# Patient Record
Sex: FEMALE | Race: WHITE | NOT HISPANIC OR LATINO | Employment: FULL TIME | URBAN - METROPOLITAN AREA
[De-identification: names, ages, dates, MRNs, and addresses within clinical notes are randomized per-mention and may not be internally consistent; named-entity substitution may affect disease eponyms.]

---

## 2018-02-02 ENCOUNTER — TRANSCRIBE ORDERS (OUTPATIENT)
Dept: RADIOLOGY | Facility: HOSPITAL | Age: 53
End: 2018-02-02

## 2018-02-02 ENCOUNTER — HOSPITAL ENCOUNTER (OUTPATIENT)
Dept: RADIOLOGY | Facility: HOSPITAL | Age: 53
Discharge: HOME | End: 2018-02-02
Attending: INTERNAL MEDICINE
Payer: COMMERCIAL

## 2018-02-02 DIAGNOSIS — R05.9 COUGH: ICD-10-CM

## 2018-02-02 DIAGNOSIS — R05.9 COUGH: Primary | ICD-10-CM

## 2018-02-02 PROCEDURE — 71046 X-RAY EXAM CHEST 2 VIEWS: CPT

## 2018-02-10 ENCOUNTER — OFFICE VISIT (OUTPATIENT)
Dept: URGENT CARE | Age: 53
End: 2018-02-10
Payer: COMMERCIAL

## 2018-02-10 VITALS
RESPIRATION RATE: 20 BRPM | HEIGHT: 65 IN | SYSTOLIC BLOOD PRESSURE: 140 MMHG | OXYGEN SATURATION: 98 % | WEIGHT: 157.8 LBS | HEART RATE: 80 BPM | TEMPERATURE: 97.2 F | DIASTOLIC BLOOD PRESSURE: 80 MMHG | BODY MASS INDEX: 26.29 KG/M2

## 2018-02-10 DIAGNOSIS — R05.9 COUGH: Primary | ICD-10-CM

## 2018-02-10 PROCEDURE — 71046 X-RAY EXAM CHEST 2 VIEWS: CPT

## 2018-02-10 PROCEDURE — 99212 OFFICE O/P EST SF 10 MIN: CPT | Performed by: FAMILY MEDICINE

## 2018-02-10 RX ORDER — AZITHROMYCIN 250 MG/1
TABLET, FILM COATED ORAL
Qty: 6 TABLET | Refills: 0 | Status: SHIPPED | OUTPATIENT
Start: 2018-02-10 | End: 2018-02-14

## 2018-02-10 RX ORDER — ASPIRIN 81 MG/1
TABLET, CHEWABLE ORAL
COMMUNITY

## 2018-02-10 RX ORDER — PROMETHAZINE HYDROCHLORIDE AND CODEINE PHOSPHATE 6.25; 1 MG/5ML; MG/5ML
5 SYRUP ORAL EVERY 4 HOURS PRN
COMMUNITY
End: 2020-10-02

## 2018-02-10 RX ORDER — BENZONATATE 100 MG/1
100 CAPSULE ORAL 3 TIMES DAILY PRN
Qty: 20 CAPSULE | Refills: 0 | Status: SHIPPED | OUTPATIENT
Start: 2018-02-10 | End: 2020-10-02

## 2018-02-10 RX ORDER — ALBUTEROL SULFATE 90 UG/1
2 AEROSOL, METERED RESPIRATORY (INHALATION) EVERY 6 HOURS PRN
COMMUNITY
End: 2020-10-02 | Stop reason: SDUPTHER

## 2018-02-10 RX ORDER — MELATONIN
1000 DAILY
COMMUNITY
End: 2020-10-02

## 2018-02-10 RX ORDER — VIT A/VIT C/VIT E/ZINC/COPPER 4296-226
CAPSULE ORAL
COMMUNITY
End: 2020-10-02

## 2018-02-10 RX ORDER — CETIRIZINE HYDROCHLORIDE 10 MG/1
TABLET ORAL
COMMUNITY

## 2018-02-10 NOTE — PATIENT INSTRUCTIONS
Possible increased consolidation on xray  Will treat  Rest and drink extra fluids  Start antibiotic  Take probiotic  Cough medication as prescribed  Cool mist humidification  Follow up with PCP if no improvement  Go to ER with worsening symptoms

## 2018-02-10 NOTE — PROGRESS NOTES
[facility]        NAME: Rafael Cadena is a 46 y o  female  : 1965    MRN: [id number]  DATE: 2018  TIME: 1:07 PM    Assessment and Plan   Cough [R05]  1  Cough  XR chest pa & lateral    azithromycin (ZITHROMAX) 250 mg tablet    benzonatate (TESSALON PERLES) 100 mg capsule         Patient Instructions     Patient Instructions   Possible increased consolidation on xray  Will treat  Rest and drink extra fluids  Start antibiotic  Take probiotic  Cough medication as prescribed  Cool mist humidification  Follow up with PCP if no improvement  Go to ER with worsening symptoms  Chief Complaint     Chief Complaint   Patient presents with    Fatigue     Pt was told by pcp she probably had the flu  Was tested and swab came out negative  2 weeks ago  Pt is still not feeling well   Cough     Pt states her cough is very productive, very thick  Xray was negative 2 weeks ago for pneuomia  Pt states she is on a inhaler and cough medication with codiene and still feels like her lungs are not better  Pt states she still feels like she has a fever but has checked several times and no fever noted  V         History of Present Illness   Rafael Cadena presents to the clinic c/o    This is a 46year old female here today with cough  She states 2 weeks ago she was seen by her PCP with cough, fever and flu like symptoms  She was tested for influenza this came back negative but  was diagnosed with influenza and her symptoms were consistent with flu  PCP told her it was influenza based upon symptoms  About a week later she continued to have cough and felt poorly  Chest xray done which was negative  She had brownish mucous which turned to clear and then back to brown  She states mucous in now green in color  She continues to feel fatigued and feverish, she has not had any fevers  She feels as though she continues to have chest congestion            Review of Systems   Review of Systems   Constitutional: Positive for fatigue  Negative for activity change and fever  HENT: Positive for congestion  Negative for ear pain, sinus pain and sore throat  Respiratory: Positive for cough  Negative for chest tightness and wheezing  Cardiovascular: Negative for chest pain  Gastrointestinal: Negative for diarrhea, nausea and vomiting  Musculoskeletal: Negative for arthralgias, joint swelling, neck pain and neck stiffness  Skin: Negative for rash  Current Medications     Long-Term Prescriptions   Medication Sig Dispense Refill    aspirin (ASPIRIN LOW STRENGTH) 81 mg chewable tablet Chew      cetirizine (ZyrTEC) 10 mg tablet Take by mouth      cholecalciferol (VITAMIN D3) 1,000 units tablet Take 1,000 Units by mouth daily      Multiple Vitamins-Minerals (PRESERVISION AREDS) CAPS Take by mouth         Current Allergies     Allergies as of 02/10/2018 - Reviewed 02/10/2018   Allergen Reaction Noted    Carbamazepine Anaphylaxis 11/09/2013    Phenytoin Anaphylaxis 11/09/2013    Sulfur Anaphylaxis 11/09/2013            The following portions of the patient's history were reviewed and updated as appropriate: allergies, current medications, past family history, past medical history, past social history, past surgical history and problem list     Objective   /80 (BP Location: Right arm, Patient Position: Sitting, Cuff Size: Standard)   Pulse 80   Temp (!) 97 2 °F (36 2 °C) (Temporal)   Resp 20   Ht 5' 5" (1 651 m)   Wt 71 6 kg (157 lb 12 8 oz)   SpO2 98%   BMI 26 26 kg/m²        Physical Exam     Physical Exam   Constitutional: She is oriented to person, place, and time  She appears well-developed and well-nourished  HENT:   Head: Normocephalic  Neck: Normal range of motion  Neck supple  Cardiovascular: Normal rate and regular rhythm  Pulmonary/Chest: Effort normal and breath sounds normal  No respiratory distress  She has no wheezes  She has no rales  Neurological: She is alert and oriented to person, place, and time  Psychiatric: She has a normal mood and affect  Radiology:   Possible increased consolidation, will check final read, will treat patient based on symptoms

## 2019-03-25 ENCOUNTER — ANNUAL EXAM (OUTPATIENT)
Dept: OBGYN CLINIC | Facility: CLINIC | Age: 54
End: 2019-03-25
Payer: COMMERCIAL

## 2019-03-25 VITALS
BODY MASS INDEX: 25.88 KG/M2 | HEIGHT: 66 IN | DIASTOLIC BLOOD PRESSURE: 80 MMHG | WEIGHT: 161 LBS | SYSTOLIC BLOOD PRESSURE: 122 MMHG

## 2019-03-25 DIAGNOSIS — R30.0 DYSURIA: ICD-10-CM

## 2019-03-25 DIAGNOSIS — N94.10 DYSPAREUNIA, FEMALE: ICD-10-CM

## 2019-03-25 DIAGNOSIS — Z01.419 ENCOUNTER FOR ANNUAL ROUTINE GYNECOLOGICAL EXAMINATION: Primary | ICD-10-CM

## 2019-03-25 DIAGNOSIS — N91.2 AMENORRHEA: ICD-10-CM

## 2019-03-25 DIAGNOSIS — Z12.39 BREAST CANCER SCREENING: ICD-10-CM

## 2019-03-25 PROCEDURE — 99386 PREV VISIT NEW AGE 40-64: CPT | Performed by: OBSTETRICS & GYNECOLOGY

## 2019-03-25 RX ORDER — METHYLPREDNISOLONE 4 MG/1
TABLET ORAL
COMMUNITY
Start: 2019-03-24 | End: 2020-10-02

## 2019-03-25 RX ORDER — CHOLECALCIFEROL (VITAMIN D3) 125 MCG
500 CAPSULE ORAL DAILY
COMMUNITY
End: 2021-11-26

## 2019-03-25 RX ORDER — ESTRADIOL 10 UG/1
1 INSERT VAGINAL 2 TIMES WEEKLY
Qty: 8 TABLET | Refills: 1 | Status: SHIPPED | OUTPATIENT
Start: 2019-03-25 | End: 2019-05-20 | Stop reason: SDUPTHER

## 2019-03-25 NOTE — PROGRESS NOTES
Assessment/Plan:    Pap smear done as well as annual   Encouraged self-breast examination as well as calcium supplementation  Recommend annual mammogram   Will check [address] level assess menopause status  Discussed treatment options for symptomatic vaginal atrophy  She continues to use a lubricant on a regular basis  We then discussed the risks and benefits of vaginal estrogen  She is aware that this is very low potency estrogen in the face of history of factor 5 Leiden  All questions answered  She will discuss this with her family physician  Return to office in 1 year or p r n  No problem-specific Assessment & Plan notes found for this encounter  Diagnoses and all orders for this visit:    Encounter for annual routine gynecological examination    Breast cancer screening  -     Mammo screening bilateral w cad; Future    Amenorrhea  -     Follicle stimulating hormone    Dysuria    Dyspareunia, female  -     estradiol (VAGIFEM, YUVAFEM) 10 MCG TABS vaginal tablet; Insert 1 tablet (10 mcg total) into the vagina 2 (two) times a week    Other orders  -     methylPREDNISolone 4 MG tablet therapy pack  -     cyanocobalamin (VITAMIN B-12) 500 mcg tablet; Take 500 mcg by mouth daily  -     Multiple Vitamins-Minerals (PRESERVISION AREDS PO); Take by mouth          Subjective:      Patient ID: Steven Morataya is a 47 y o  female  HPI     This is a 63-year-old female  ( x1,  x1 with tubal ligation) presents for annual gyn exam   Her last gyn exam was approximately 4 years ago  Patient underwent an endometrial ablation at age 40  She has been amenorrheic since then  She has never had hot flashes or night sweats  She denies any vaginal bleeding or spotting  Over the last 1-2 year she has had increase in vaginal dryness and dyspareunia due to dryness  She has been in a monogamous relationship with her  for over 34 years  Patient underwent a cone biopsy at age 27    Her Pap smears since then have been normal   Patient has never had a screening colonoscopy  Last mammogram was approximately 4 years ago  She is also due to see her primary care physician for routine blood work  Patient has tested positive for factor 5 Leiden, heterozygous approximately 10 years ago  She was screened by her neurologist for persistent severe headaches  She has never had history of thrombosis, no family history  I discussed treatment options for vaginal dryness, symptomatic including vaginal estrogen  Risks and benefits reviewed  The following portions of the patient's history were reviewed and updated as appropriate: allergies, current medications, past family history, past medical history, past social history, past surgical history and problem list     Review of Systems   Constitutional: Negative for fatigue, fever and unexpected weight change  Respiratory: Negative for cough, chest tightness, shortness of breath and wheezing  Cardiovascular: Negative  Negative for chest pain and palpitations  Gastrointestinal: Negative  Negative for abdominal distention, abdominal pain, blood in stool, constipation, diarrhea, nausea and vomiting  Genitourinary: Negative  Negative for difficulty urinating, dyspareunia, dysuria, flank pain, frequency, genital sores, hematuria, pelvic pain, urgency, vaginal bleeding, vaginal discharge and vaginal pain  Skin: Negative for rash  Objective:      /80   Ht 5' 5 5" (1 664 m)   Wt 73 kg (161 lb)   Breastfeeding? No   BMI 26 38 kg/m²          Physical Exam   Constitutional: She appears well-developed and well-nourished  Cardiovascular: Normal rate and regular rhythm  Pulmonary/Chest: Effort normal and breath sounds normal  Right breast exhibits no inverted nipple, no mass, no nipple discharge, no skin change and no tenderness  Left breast exhibits no inverted nipple, no mass, no nipple discharge, no skin change and no tenderness     Bilateral saline implants noted  Abdominal: Soft  Bowel sounds are normal  She exhibits no distension  There is no tenderness  There is no rebound and no guarding  Diffuse scarring noted due to abdominal plasty surgery   Genitourinary: Vagina normal and uterus normal  There is no lesion on the right labia  There is no lesion on the left labia  Cervix exhibits no discharge and no friability  Right adnexum displays no mass, no tenderness and no fullness  Left adnexum displays no mass, no tenderness and no fullness  No vaginal discharge found  Genitourinary Comments: The vagina is evident of estrogen deficiency, diffusely palor  Cervix is distorted due to prior surgery  There is no evidence of pelvic floor prolapse    Rectovaginal exam is confirmatory

## 2019-03-27 LAB
CLINICAL INFO: NORMAL
CYTO CVX: NORMAL
CYTOLOGY CMNT CVX/VAG CYTO-IMP: NORMAL
DATE PREVIOUS BX: NORMAL
HPV E6+E7 MRNA CVX QL NAA+PROBE: NOT DETECTED
LMP START DATE: NORMAL
SL AMB PREV. PAP:: NORMAL
SPECIMEN SOURCE CVX/VAG CYTO: NORMAL

## 2019-05-14 DIAGNOSIS — N94.10 DYSPAREUNIA, FEMALE: ICD-10-CM

## 2019-05-20 RX ORDER — ESTRADIOL 10 UG/1
1 INSERT VAGINAL WEEKLY
Qty: 12 TABLET | Refills: 3 | Status: SHIPPED | OUTPATIENT
Start: 2019-05-20 | End: 2020-10-02

## 2019-05-22 DIAGNOSIS — N94.10 DYSPAREUNIA, FEMALE: ICD-10-CM

## 2019-05-22 RX ORDER — ESTRADIOL 10 UG/1
TABLET VAGINAL
Qty: 8 TABLET | Refills: 1 | Status: SHIPPED | OUTPATIENT
Start: 2019-05-22 | End: 2020-04-07

## 2020-04-07 DIAGNOSIS — N94.10 DYSPAREUNIA, FEMALE: ICD-10-CM

## 2020-04-07 RX ORDER — ESTRADIOL 10 UG/1
TABLET VAGINAL
Qty: 8 TABLET | Refills: 1 | Status: SHIPPED | OUTPATIENT
Start: 2020-04-07 | End: 2020-10-02

## 2020-10-02 ENCOUNTER — OFFICE VISIT (OUTPATIENT)
Dept: INTERNAL MEDICINE CLINIC | Facility: CLINIC | Age: 55
End: 2020-10-02
Payer: COMMERCIAL

## 2020-10-02 VITALS
HEIGHT: 65 IN | WEIGHT: 157 LBS | SYSTOLIC BLOOD PRESSURE: 123 MMHG | HEART RATE: 90 BPM | DIASTOLIC BLOOD PRESSURE: 84 MMHG | BODY MASS INDEX: 26.16 KG/M2 | TEMPERATURE: 95.9 F

## 2020-10-02 DIAGNOSIS — G43.009 MIGRAINE WITHOUT AURA AND WITHOUT STATUS MIGRAINOSUS, NOT INTRACTABLE: ICD-10-CM

## 2020-10-02 DIAGNOSIS — Z12.31 ENCOUNTER FOR SCREENING MAMMOGRAM FOR MALIGNANT NEOPLASM OF BREAST: ICD-10-CM

## 2020-10-02 DIAGNOSIS — J45.20 MILD INTERMITTENT ASTHMA WITHOUT COMPLICATION: ICD-10-CM

## 2020-10-02 DIAGNOSIS — F34.1 DYSTHYMIC DISORDER: ICD-10-CM

## 2020-10-02 DIAGNOSIS — Z00.01 ENCOUNTER FOR GENERAL ADULT MEDICAL EXAMINATION WITH ABNORMAL FINDINGS: Primary | ICD-10-CM

## 2020-10-02 DIAGNOSIS — M67.911 DISORDER OF ROTATOR CUFF, RIGHT: ICD-10-CM

## 2020-10-02 DIAGNOSIS — R53.83 FATIGUE, UNSPECIFIED TYPE: ICD-10-CM

## 2020-10-02 DIAGNOSIS — M51.16 LUMBAR DISC HERNIATION WITH RADICULOPATHY: ICD-10-CM

## 2020-10-02 DIAGNOSIS — Z12.11 ENCOUNTER FOR SCREENING FOR MALIGNANT NEOPLASM OF COLON: ICD-10-CM

## 2020-10-02 PROCEDURE — 99396 PREV VISIT EST AGE 40-64: CPT | Performed by: INTERNAL MEDICINE

## 2020-10-02 PROCEDURE — 3725F SCREEN DEPRESSION PERFORMED: CPT | Performed by: INTERNAL MEDICINE

## 2020-10-02 RX ORDER — ALBUTEROL SULFATE 90 UG/1
2 AEROSOL, METERED RESPIRATORY (INHALATION) EVERY 6 HOURS PRN
Qty: 1 INHALER | Refills: 0 | Status: SHIPPED | OUTPATIENT
Start: 2020-10-02

## 2020-10-10 ENCOUNTER — LAB (OUTPATIENT)
Dept: LAB | Age: 55
End: 2020-10-10
Payer: COMMERCIAL

## 2020-10-10 DIAGNOSIS — Z00.01 ENCOUNTER FOR GENERAL ADULT MEDICAL EXAMINATION WITH ABNORMAL FINDINGS: ICD-10-CM

## 2020-10-10 DIAGNOSIS — R53.83 FATIGUE, UNSPECIFIED TYPE: ICD-10-CM

## 2020-10-10 LAB
ALBUMIN SERPL BCP-MCNC: 3.8 G/DL (ref 3.5–5)
ALP SERPL-CCNC: 55 U/L (ref 46–116)
ALT SERPL W P-5'-P-CCNC: 18 U/L (ref 12–78)
ANION GAP SERPL CALCULATED.3IONS-SCNC: 2 MMOL/L (ref 4–13)
AST SERPL W P-5'-P-CCNC: 9 U/L (ref 5–45)
BASOPHILS # BLD AUTO: 0.05 THOUSANDS/ΜL (ref 0–0.1)
BASOPHILS NFR BLD AUTO: 1 % (ref 0–1)
BILIRUB SERPL-MCNC: 0.4 MG/DL (ref 0.2–1)
BUN SERPL-MCNC: 14 MG/DL (ref 5–25)
CALCIUM SERPL-MCNC: 9.3 MG/DL (ref 8.3–10.1)
CHLORIDE SERPL-SCNC: 108 MMOL/L (ref 100–108)
CHOLEST SERPL-MCNC: 225 MG/DL (ref 50–200)
CO2 SERPL-SCNC: 31 MMOL/L (ref 21–32)
CREAT SERPL-MCNC: 0.74 MG/DL (ref 0.6–1.3)
EOSINOPHIL # BLD AUTO: 0.32 THOUSAND/ΜL (ref 0–0.61)
EOSINOPHIL NFR BLD AUTO: 6 % (ref 0–6)
ERYTHROCYTE [DISTWIDTH] IN BLOOD BY AUTOMATED COUNT: 12.4 % (ref 11.6–15.1)
GFR SERPL CREATININE-BSD FRML MDRD: 91 ML/MIN/1.73SQ M
GLUCOSE P FAST SERPL-MCNC: 93 MG/DL (ref 65–99)
HCT VFR BLD AUTO: 43.1 % (ref 34.8–46.1)
HDLC SERPL-MCNC: 74 MG/DL
HGB BLD-MCNC: 14 G/DL (ref 11.5–15.4)
IMM GRANULOCYTES # BLD AUTO: 0.02 THOUSAND/UL (ref 0–0.2)
IMM GRANULOCYTES NFR BLD AUTO: 0 % (ref 0–2)
LDLC SERPL CALC-MCNC: 140 MG/DL (ref 0–100)
LYMPHOCYTES # BLD AUTO: 2.33 THOUSANDS/ΜL (ref 0.6–4.47)
LYMPHOCYTES NFR BLD AUTO: 42 % (ref 14–44)
MCH RBC QN AUTO: 28.6 PG (ref 26.8–34.3)
MCHC RBC AUTO-ENTMCNC: 32.5 G/DL (ref 31.4–37.4)
MCV RBC AUTO: 88 FL (ref 82–98)
MONOCYTES # BLD AUTO: 0.5 THOUSAND/ΜL (ref 0.17–1.22)
MONOCYTES NFR BLD AUTO: 9 % (ref 4–12)
NEUTROPHILS # BLD AUTO: 2.38 THOUSANDS/ΜL (ref 1.85–7.62)
NEUTS SEG NFR BLD AUTO: 42 % (ref 43–75)
NRBC BLD AUTO-RTO: 0 /100 WBCS
PLATELET # BLD AUTO: 202 THOUSANDS/UL (ref 149–390)
PMV BLD AUTO: 9.9 FL (ref 8.9–12.7)
POTASSIUM SERPL-SCNC: 4.2 MMOL/L (ref 3.5–5.3)
PROT SERPL-MCNC: 7.1 G/DL (ref 6.4–8.2)
RBC # BLD AUTO: 4.9 MILLION/UL (ref 3.81–5.12)
SODIUM SERPL-SCNC: 141 MMOL/L (ref 136–145)
TRIGL SERPL-MCNC: 54 MG/DL
TSH SERPL DL<=0.05 MIU/L-ACNC: 3.15 UIU/ML (ref 0.36–3.74)
WBC # BLD AUTO: 5.6 THOUSAND/UL (ref 4.31–10.16)

## 2020-10-10 PROCEDURE — 80061 LIPID PANEL: CPT

## 2020-10-10 PROCEDURE — 85025 COMPLETE CBC W/AUTO DIFF WBC: CPT

## 2020-10-10 PROCEDURE — 36415 COLL VENOUS BLD VENIPUNCTURE: CPT

## 2020-10-10 PROCEDURE — 80053 COMPREHEN METABOLIC PANEL: CPT

## 2020-10-10 PROCEDURE — 84443 ASSAY THYROID STIM HORMONE: CPT

## 2020-10-21 ENCOUNTER — OFFICE VISIT (OUTPATIENT)
Dept: INTERNAL MEDICINE CLINIC | Facility: CLINIC | Age: 55
End: 2020-10-21
Payer: COMMERCIAL

## 2020-10-21 VITALS
TEMPERATURE: 96.8 F | HEART RATE: 72 BPM | DIASTOLIC BLOOD PRESSURE: 88 MMHG | BODY MASS INDEX: 26.49 KG/M2 | WEIGHT: 159 LBS | SYSTOLIC BLOOD PRESSURE: 139 MMHG | HEIGHT: 65 IN

## 2020-10-21 DIAGNOSIS — M51.16 LUMBAR DISC HERNIATION WITH RADICULOPATHY: ICD-10-CM

## 2020-10-21 DIAGNOSIS — J45.20 MILD INTERMITTENT ASTHMA WITHOUT COMPLICATION: ICD-10-CM

## 2020-10-21 DIAGNOSIS — H92.01 PAIN IN RIGHT EAR: ICD-10-CM

## 2020-10-21 DIAGNOSIS — G43.009 MIGRAINE WITHOUT AURA AND WITHOUT STATUS MIGRAINOSUS, NOT INTRACTABLE: ICD-10-CM

## 2020-10-21 DIAGNOSIS — L03.221 CELLULITIS OF NECK: Primary | ICD-10-CM

## 2020-10-21 PROCEDURE — 1036F TOBACCO NON-USER: CPT | Performed by: INTERNAL MEDICINE

## 2020-10-21 PROCEDURE — 99213 OFFICE O/P EST LOW 20 MIN: CPT | Performed by: INTERNAL MEDICINE

## 2020-10-21 RX ORDER — MULTIVITAMIN WITH IRON
TABLET ORAL
COMMUNITY
End: 2021-04-15

## 2020-10-21 RX ORDER — CEPHALEXIN 500 MG/1
500 CAPSULE ORAL EVERY 6 HOURS SCHEDULED
Qty: 28 CAPSULE | Refills: 0 | Status: SHIPPED | OUTPATIENT
Start: 2020-10-21 | End: 2020-10-28

## 2020-11-03 DIAGNOSIS — J45.20 MILD INTERMITTENT ASTHMA WITHOUT COMPLICATION: ICD-10-CM

## 2020-11-03 PROCEDURE — U0003 INFECTIOUS AGENT DETECTION BY NUCLEIC ACID (DNA OR RNA); SEVERE ACUTE RESPIRATORY SYNDROME CORONAVIRUS 2 (SARS-COV-2) (CORONAVIRUS DISEASE [COVID-19]), AMPLIFIED PROBE TECHNIQUE, MAKING USE OF HIGH THROUGHPUT TECHNOLOGIES AS DESCRIBED BY CMS-2020-01-R: HCPCS | Performed by: NURSE PRACTITIONER

## 2020-11-04 LAB — SARS-COV-2 RNA SPEC QL NAA+PROBE: NOT DETECTED

## 2020-11-25 ENCOUNTER — ANNUAL EXAM (OUTPATIENT)
Dept: OBGYN CLINIC | Facility: CLINIC | Age: 55
End: 2020-11-25
Payer: COMMERCIAL

## 2020-11-25 VITALS
HEIGHT: 65 IN | WEIGHT: 164.2 LBS | SYSTOLIC BLOOD PRESSURE: 118 MMHG | BODY MASS INDEX: 27.36 KG/M2 | DIASTOLIC BLOOD PRESSURE: 64 MMHG

## 2020-11-25 DIAGNOSIS — Z12.39 ENCOUNTER FOR SCREENING FOR MALIGNANT NEOPLASM OF BREAST, UNSPECIFIED SCREENING MODALITY: ICD-10-CM

## 2020-11-25 DIAGNOSIS — Z01.419 ENCOUNTER FOR ANNUAL ROUTINE GYNECOLOGICAL EXAMINATION: Primary | ICD-10-CM

## 2020-11-25 DIAGNOSIS — Z12.11 ENCOUNTER FOR SCREENING COLONOSCOPY: ICD-10-CM

## 2020-11-25 PROCEDURE — 3725F SCREEN DEPRESSION PERFORMED: CPT | Performed by: OBSTETRICS & GYNECOLOGY

## 2020-11-25 PROCEDURE — 99396 PREV VISIT EST AGE 40-64: CPT | Performed by: OBSTETRICS & GYNECOLOGY

## 2020-11-25 PROCEDURE — 1036F TOBACCO NON-USER: CPT | Performed by: OBSTETRICS & GYNECOLOGY

## 2020-11-25 PROCEDURE — 3008F BODY MASS INDEX DOCD: CPT | Performed by: OBSTETRICS & GYNECOLOGY

## 2020-11-25 RX ORDER — MELATONIN
1000 DAILY
COMMUNITY
End: 2021-11-26

## 2020-11-30 LAB
CLINICAL INFO: NORMAL
CYTO CVX: NORMAL
CYTOLOGY CMNT CVX/VAG CYTO-IMP: NORMAL
DATE PREVIOUS BX: NORMAL
LMP START DATE: NORMAL
SL AMB PREV. PAP:: NORMAL
SPECIMEN SOURCE CVX/VAG CYTO: NORMAL

## 2020-12-11 DIAGNOSIS — J45.20 MILD INTERMITTENT ASTHMA WITHOUT COMPLICATION: ICD-10-CM

## 2020-12-11 DIAGNOSIS — J45.20 MILD INTERMITTENT ASTHMA, UNSPECIFIED WHETHER COMPLICATED: ICD-10-CM

## 2020-12-11 PROCEDURE — U0003 INFECTIOUS AGENT DETECTION BY NUCLEIC ACID (DNA OR RNA); SEVERE ACUTE RESPIRATORY SYNDROME CORONAVIRUS 2 (SARS-COV-2) (CORONAVIRUS DISEASE [COVID-19]), AMPLIFIED PROBE TECHNIQUE, MAKING USE OF HIGH THROUGHPUT TECHNOLOGIES AS DESCRIBED BY CMS-2020-01-R: HCPCS | Performed by: NURSE PRACTITIONER

## 2020-12-12 LAB — SARS-COV-2 RNA SPEC QL NAA+PROBE: NOT DETECTED

## 2021-01-07 ENCOUNTER — IMMUNIZATIONS (OUTPATIENT)
Dept: FAMILY MEDICINE CLINIC | Facility: HOSPITAL | Age: 56
End: 2021-01-07

## 2021-01-07 DIAGNOSIS — Z23 ENCOUNTER FOR IMMUNIZATION: ICD-10-CM

## 2021-01-07 PROCEDURE — 91301 SARS-COV-2 / COVID-19 MRNA VACCINE (MODERNA) 100 MCG: CPT | Performed by: EMERGENCY MEDICINE

## 2021-01-07 PROCEDURE — 0011A SARS-COV-2 / COVID-19 MRNA VACCINE (MODERNA) 100 MCG: CPT | Performed by: EMERGENCY MEDICINE

## 2021-01-20 ENCOUNTER — OFFICE VISIT (OUTPATIENT)
Dept: INTERNAL MEDICINE CLINIC | Facility: CLINIC | Age: 56
End: 2021-01-20
Payer: COMMERCIAL

## 2021-01-20 VITALS
BODY MASS INDEX: 27.49 KG/M2 | SYSTOLIC BLOOD PRESSURE: 116 MMHG | HEIGHT: 65 IN | DIASTOLIC BLOOD PRESSURE: 74 MMHG | WEIGHT: 165 LBS | TEMPERATURE: 96.3 F | OXYGEN SATURATION: 99 % | HEART RATE: 79 BPM

## 2021-01-20 DIAGNOSIS — K21.9 GASTROESOPHAGEAL REFLUX DISEASE, UNSPECIFIED WHETHER ESOPHAGITIS PRESENT: ICD-10-CM

## 2021-01-20 DIAGNOSIS — R13.19 OTHER DYSPHAGIA: Primary | ICD-10-CM

## 2021-01-20 DIAGNOSIS — M51.16 LUMBAR DISC HERNIATION WITH RADICULOPATHY: ICD-10-CM

## 2021-01-20 DIAGNOSIS — G43.009 MIGRAINE WITHOUT AURA AND WITHOUT STATUS MIGRAINOSUS, NOT INTRACTABLE: ICD-10-CM

## 2021-01-20 DIAGNOSIS — R13.19 OTHER DYSPHAGIA: ICD-10-CM

## 2021-01-20 DIAGNOSIS — Z11.59 NEED FOR HEPATITIS C SCREENING TEST: ICD-10-CM

## 2021-01-20 DIAGNOSIS — J45.20 MILD INTERMITTENT ASTHMA WITHOUT COMPLICATION: ICD-10-CM

## 2021-01-20 PROCEDURE — 3725F SCREEN DEPRESSION PERFORMED: CPT | Performed by: INTERNAL MEDICINE

## 2021-01-20 PROCEDURE — 3008F BODY MASS INDEX DOCD: CPT | Performed by: INTERNAL MEDICINE

## 2021-01-20 PROCEDURE — 1036F TOBACCO NON-USER: CPT | Performed by: INTERNAL MEDICINE

## 2021-01-20 PROCEDURE — 99214 OFFICE O/P EST MOD 30 MIN: CPT | Performed by: INTERNAL MEDICINE

## 2021-01-20 RX ORDER — LORATADINE 10 MG/1
CAPSULE, LIQUID FILLED ORAL
COMMUNITY

## 2021-01-20 RX ORDER — PANTOPRAZOLE SODIUM 40 MG/1
40 TABLET, DELAYED RELEASE ORAL
Qty: 30 TABLET | Refills: 1 | Status: SHIPPED | OUTPATIENT
Start: 2021-01-20 | End: 2021-01-20

## 2021-01-20 RX ORDER — ESOMEPRAZOLE MAGNESIUM 40 MG/1
40 CAPSULE, DELAYED RELEASE ORAL
Qty: 30 CAPSULE | Refills: 1 | Status: SHIPPED | OUTPATIENT
Start: 2021-01-20 | End: 2021-01-21

## 2021-01-20 NOTE — PROGRESS NOTES
Assessment/Plan:    BMI Counseling: Body mass index is 27 46 kg/m²  The BMI is above normal  Nutrition recommendations include decreasing portion sizes, encouraging healthy choices of fruits and vegetables and decreasing fast food intake  Exercise recommendations include moderate physical activity 150 minutes/week  1  Other dysphagia  -     Ambulatory Referral to GI Endoscopy; Future    2  Gastroesophageal reflux disease, unspecified whether esophagitis present  -     Ambulatory Referral to GI Endoscopy; Future  -     pantoprazole (PROTONIX) 40 mg tablet; Take 1 tablet (40 mg total) by mouth daily before breakfast    3  Lumbar disc herniation with radiculopathy    4  Migraine without aura and without status migrainosus, not intractable    5  Need for hepatitis C screening test  -     Hepatitis C antibody; Future    6  Mild intermittent asthma without complication           Subjective:      Patient ID: Brody Najera is a 54 y o  female  Stomach pain, feels food is stuck in the chest, burning sensation, no change in the diet      The following portions of the patient's history were reviewed and updated as appropriate: She  has a past medical history of Allergic rhinitis, Anxiety and depression, Asthma, DDD (degenerative disc disease), lumbar, Factor 5 Leiden mutation, heterozygous ([facility] [address] ), Migraine, and Seizures ([facility] )    She   Patient Active Problem List    Diagnosis Date Noted    Other dysphagia 01/20/2021    Need for hepatitis C screening test 01/20/2021    Gastroesophageal reflux disease 01/20/2021    Mild intermittent asthma without complication 59/42/5444    Migraine without aura and without status migrainosus, not intractable 10/02/2020    Lumbar disc herniation with radiculopathy 10/02/2020    Disorder of rotator cuff, right 10/02/2020    Dysthymic disorder 10/02/2020    Encounter for general adult medical examination with abnormal findings 10/02/2020    Body mass index 26 0-26 9, adult 10/02/2020    Fatigue 10/02/2020    Asthma 2013     She  has a past surgical history that includes  section; AUGMENTATION BREAST; Endometrial ablation; Tubal ligation; Abdominoplasty; Cervical cone biopsy (); Rotator cuff repair; Tonsillectomy; and Mammo (historical) ()  Her family history includes Bipolar disorder in her son; Cancer in her maternal grandfather, maternal grandmother, mother, paternal grandfather, and paternal grandmother; Diabetes in her father; Heart disease in her father; Hypertension in her brother and father; Lung cancer in her mother; Post-traumatic stress disorder in her son; Rheum arthritis in her father; Stroke in her father; Thyroid disease in her father and sister  She  reports that she has never smoked  She has never used smokeless tobacco  She reports that she does not drink alcohol or use drugs    Current Outpatient Medications   Medication Sig Dispense Refill    albuterol (PROVENTIL HFA,VENTOLIN HFA) 90 mcg/act inhaler Inhale 2 puffs every 6 (six) hours as needed for wheezing 1 Inhaler 0    aspirin (ASPIRIN LOW STRENGTH) 81 mg chewable tablet Chew      cholecalciferol (VITAMIN D3) 1,000 units tablet Take 1,000 Units by mouth daily      cyanocobalamin (VITAMIN B-12) 500 mcg tablet Take 500 mcg by mouth daily      famotidine-calcium carbonate-magnesium hydroxide (PEPCID COMPLETE) -165 MG CHEW Chew 1 tablet daily as needed for heartburn      Loratadine 10 MG CAPS Take by mouth      Melatonin 1 MG CAPS Take 1 mg by mouth      Multiple Vitamins-Minerals (PRESERVISION AREDS PO) Take by mouth      cetirizine (ZyrTEC) 10 mg tablet Take by mouth      Magnesium 250 MG TABS Take by mouth      neomycin-polymyxin-hydrocortisone (CORTISPORIN) otic solution Administer 4 drops to the right ear every 6 (six) hours (Patient not taking: Reported on 2020) 10 mL 0    pantoprazole (PROTONIX) 40 mg tablet Take 1 tablet (40 mg total) by mouth daily before breakfast 30 tablet 1     No current facility-administered medications for this visit  Current Outpatient Medications on File Prior to Visit   Medication Sig    albuterol (PROVENTIL HFA,VENTOLIN HFA) 90 mcg/act inhaler Inhale 2 puffs every 6 (six) hours as needed for wheezing    aspirin (ASPIRIN LOW STRENGTH) 81 mg chewable tablet Chew    cholecalciferol (VITAMIN D3) 1,000 units tablet Take 1,000 Units by mouth daily    cyanocobalamin (VITAMIN B-12) 500 mcg tablet Take 500 mcg by mouth daily    famotidine-calcium carbonate-magnesium hydroxide (PEPCID COMPLETE) -165 MG CHEW Chew 1 tablet daily as needed for heartburn    Loratadine 10 MG CAPS Take by mouth    Melatonin 1 MG CAPS Take 1 mg by mouth    Multiple Vitamins-Minerals (PRESERVISION AREDS PO) Take by mouth    cetirizine (ZyrTEC) 10 mg tablet Take by mouth    Magnesium 250 MG TABS Take by mouth    neomycin-polymyxin-hydrocortisone (CORTISPORIN) otic solution Administer 4 drops to the right ear every 6 (six) hours (Patient not taking: Reported on 11/25/2020)     No current facility-administered medications on file prior to visit  She is allergic to carbamazepine; phenytoin; sulfur; and succinylcholine       Review of Systems   Constitutional: Negative for chills and fever  HENT: Negative for congestion, ear pain and sore throat  Eyes: Negative for pain  Respiratory: Negative for cough and shortness of breath  Cardiovascular: Negative for chest pain and leg swelling  Gastrointestinal: Positive for abdominal pain and nausea  Negative for blood in stool, diarrhea and vomiting  Endocrine: Negative for polyuria  Genitourinary: Negative for difficulty urinating, frequency and urgency  Musculoskeletal: Negative for arthralgias and back pain  Skin: Negative for rash  Neurological: Negative for weakness and headaches  Psychiatric/Behavioral: Negative for sleep disturbance  The patient is not nervous/anxious  Objective:      /74 (BP Location: Left arm, Patient Position: Sitting, Cuff Size: Standard)   Pulse 79   Temp (!) 96 3 °F (35 7 °C) (Temporal)   Ht 5' 5" (1 651 m)   Wt 74 8 kg (165 lb)   SpO2 99%   BMI 27 46 kg/m²     Recent Results (from the past 1344 hour(s))   Novel Coronavirus (COVID-19), PCR for [facility]    Collection Time: 12/11/20  3:06 PM    Specimen: Nose; Nares   Result Value Ref Range    SARS-CoV-2  Not Detected Not Detected        Physical Exam  Constitutional:       Appearance: Normal appearance  HENT:      Head: Normocephalic  Right Ear: Tympanic membrane, ear canal and external ear normal       Left Ear: Tympanic membrane, ear canal and external ear normal       Nose: Nose normal  No congestion  Mouth/Throat:      Mouth: Mucous membranes are moist       Pharynx: Oropharynx is clear  No oropharyngeal exudate or posterior oropharyngeal erythema  Eyes:      Extraocular Movements: Extraocular movements intact  Conjunctiva/sclera: Conjunctivae normal       Pupils: Pupils are equal, round, and reactive to light  Neck:      Musculoskeletal: Normal range of motion and neck supple  Cardiovascular:      Rate and Rhythm: Normal rate and regular rhythm  Heart sounds: Normal heart sounds  No murmur  Pulmonary:      Effort: Pulmonary effort is normal       Breath sounds: Normal breath sounds  No wheezing or rales  Abdominal:      General: Bowel sounds are normal  There is no distension  Palpations: Abdomen is soft  Tenderness: There is no abdominal tenderness  Musculoskeletal: Normal range of motion  Right lower leg: No edema  Left lower leg: No edema  Lymphadenopathy:      Cervical: No cervical adenopathy  Skin:     General: Skin is warm  Neurological:      General: No focal deficit present  Mental Status: She is alert and oriented to person, place, and time

## 2021-01-21 ENCOUNTER — TELEPHONE (OUTPATIENT)
Dept: INTERNAL MEDICINE CLINIC | Facility: CLINIC | Age: 56
End: 2021-01-21

## 2021-01-21 DIAGNOSIS — R13.19 OTHER DYSPHAGIA: ICD-10-CM

## 2021-01-21 DIAGNOSIS — K21.9 GASTROESOPHAGEAL REFLUX DISEASE, UNSPECIFIED WHETHER ESOPHAGITIS PRESENT: Primary | ICD-10-CM

## 2021-01-21 DIAGNOSIS — K21.9 GASTROESOPHAGEAL REFLUX DISEASE, UNSPECIFIED WHETHER ESOPHAGITIS PRESENT: ICD-10-CM

## 2021-01-21 RX ORDER — OMEPRAZOLE 40 MG/1
CAPSULE, DELAYED RELEASE ORAL
Qty: 30 CAPSULE | Refills: 1 | Status: SHIPPED | OUTPATIENT
Start: 2021-01-21 | End: 2021-01-21

## 2021-01-21 RX ORDER — ESOMEPRAZOLE MAGNESIUM 40 MG/1
40 CAPSULE, DELAYED RELEASE ORAL DAILY
Qty: 90 CAPSULE | Refills: 0 | Status: SHIPPED | OUTPATIENT
Start: 2021-01-21 | End: 2021-04-15

## 2021-01-21 RX ORDER — OMEPRAZOLE 40 MG/1
40 CAPSULE, DELAYED RELEASE ORAL
Qty: 30 CAPSULE | Refills: 1 | Status: SHIPPED | OUTPATIENT
Start: 2021-01-21 | End: 2021-01-21 | Stop reason: CLARIF

## 2021-01-21 NOTE — TELEPHONE ENCOUNTER
Pt is at pharmacy right now and the omeprazole 20 mg is not covered, her plan will cover esomeprazole magnesium 40 mg capsule ER

## 2021-01-21 NOTE — TELEPHONE ENCOUNTER
Can you check with the pharmacy, what medication will be covered ,it is back and forth since last evening, you can check all the messages ,encounters

## 2021-01-22 ENCOUNTER — TELEPHONE (OUTPATIENT)
Dept: INTERNAL MEDICINE CLINIC | Facility: CLINIC | Age: 56
End: 2021-01-22

## 2021-01-22 NOTE — TELEPHONE ENCOUNTER
Called [facility] help desk at [phone]  Spoke to [place] 89  She says that the plan does not cover this medication at all  She says it is excluded from her plan and a prior auth cannot be done for this  I also asked if a prior auth can be done for Omeprazole or Pantoprazole? She says no  All three medications are excluded from the members plan

## 2021-02-04 ENCOUNTER — IMMUNIZATIONS (OUTPATIENT)
Dept: FAMILY MEDICINE CLINIC | Facility: HOSPITAL | Age: 56
End: 2021-02-04

## 2021-02-04 DIAGNOSIS — Z23 ENCOUNTER FOR IMMUNIZATION: Primary | ICD-10-CM

## 2021-02-04 PROCEDURE — 0012A SARS-COV-2 / COVID-19 MRNA VACCINE (MODERNA) 100 MCG: CPT | Performed by: ANESTHESIOLOGY

## 2021-02-04 PROCEDURE — 91301 SARS-COV-2 / COVID-19 MRNA VACCINE (MODERNA) 100 MCG: CPT | Performed by: ANESTHESIOLOGY

## 2021-02-07 ENCOUNTER — NURSE TRIAGE (OUTPATIENT)
Dept: OTHER | Facility: OTHER | Age: 56
End: 2021-02-07

## 2021-02-07 NOTE — TELEPHONE ENCOUNTER
Attempted to schedule a follow up visit with PCP tomorrow, but no availability  Scheduled for Tuesday and informed patient to call the office tomorrow to verify

## 2021-02-07 NOTE — TELEPHONE ENCOUNTER
Regarding: COVID QUESTIONS  ----- Message from Supriya Mercado sent at 2/7/2021  8:03 AM EST -----  "I received 2nd moderna shot on thursday    My arm is warm to the touch and red and swollen''

## 2021-02-07 NOTE — TELEPHONE ENCOUNTER
Reason for Disposition   [1] Redness or red streak around the injection site AND [2] started > 48 hours after getting vaccine AND [3] no fever  (Exception: red area < 1 inch or 2 5 cm wide)    Answer Assessment - Initial Assessment Questions  1  MAIN CONCERN OR SYMPTOM:  "What is your main concern right now?" "What question do you have?" "What's the main symptom you're worried about?" (e g , fever, pain, redness, swelling)      Redness, warmth, itchiness and swelling at injection site     2  VACCINE: "What vaccination did you receive?" "Is this your first or second shot?" (e g , none; Jaqueline Enamorado, other)      Moderna    3  SYMPTOM ONSET: "When did the s/s begin?" (e g , not relevant; hours, days)       Yesterday    4  SYMPTOM SEVERITY: "How bad is it?"       Redness is about 5x4 inches    5  FEVER: "Is there a fever?" If so, ask: "What is it, how was it measured, and when did it start?"       Denies    6  PAST REACTIONS: "Have you reacted to immunizations before?" If so, ask: "What happened?"      Denies    7   OTHER SYMPTOMS: "Do you have any other symptoms?"      Headache, but has resolved    Protocols used: CORONAVIRUS (COVID-19) VACCINE QUESTIONS AND REACTIONS-ADULT-

## 2021-02-25 ENCOUNTER — TELEPHONE (OUTPATIENT)
Dept: INTERNAL MEDICINE CLINIC | Facility: CLINIC | Age: 56
End: 2021-02-25

## 2021-02-25 DIAGNOSIS — M51.16 LUMBAR DISC HERNIATION WITH RADICULOPATHY: Primary | ICD-10-CM

## 2021-02-25 RX ORDER — METHYLPREDNISOLONE 4 MG/1
TABLET ORAL
Qty: 21 EACH | Refills: 0 | Status: SHIPPED | OUTPATIENT
Start: 2021-02-25 | End: 2021-04-15

## 2021-02-25 NOTE — TELEPHONE ENCOUNTER
PT CALLED, IS HAVING HIP PAIN SHE HAS HAD BEFORE  IS ASKING FOR MEDROL AS SHE HAS TAKEN IT BEFORE BUT I DO NOT SEE IT ON HER MED LIST

## 2021-04-15 ENCOUNTER — OFFICE VISIT (OUTPATIENT)
Dept: INTERNAL MEDICINE CLINIC | Facility: CLINIC | Age: 56
End: 2021-04-15
Payer: COMMERCIAL

## 2021-04-15 VITALS
WEIGHT: 157 LBS | BODY MASS INDEX: 26.16 KG/M2 | DIASTOLIC BLOOD PRESSURE: 78 MMHG | TEMPERATURE: 97.7 F | SYSTOLIC BLOOD PRESSURE: 130 MMHG | HEIGHT: 65 IN | HEART RATE: 80 BPM

## 2021-04-15 DIAGNOSIS — E78.2 MIXED HYPERLIPIDEMIA: ICD-10-CM

## 2021-04-15 DIAGNOSIS — M51.16 LUMBAR DISC HERNIATION WITH RADICULOPATHY: ICD-10-CM

## 2021-04-15 DIAGNOSIS — K21.9 GASTROESOPHAGEAL REFLUX DISEASE, UNSPECIFIED WHETHER ESOPHAGITIS PRESENT: ICD-10-CM

## 2021-04-15 DIAGNOSIS — K22.2 LOWER ESOPHAGEAL RING (SCHATZKI): Primary | ICD-10-CM

## 2021-04-15 DIAGNOSIS — M81.0 POST-MENOPAUSAL OSTEOPOROSIS: ICD-10-CM

## 2021-04-15 DIAGNOSIS — G43.009 MIGRAINE WITHOUT AURA AND WITHOUT STATUS MIGRAINOSUS, NOT INTRACTABLE: ICD-10-CM

## 2021-04-15 DIAGNOSIS — J45.20 MILD INTERMITTENT ASTHMA WITHOUT COMPLICATION: ICD-10-CM

## 2021-04-15 PROBLEM — Z78.0 OSTEOPENIA AFTER MENOPAUSE: Status: ACTIVE | Noted: 2021-04-15

## 2021-04-15 PROBLEM — M85.80 OSTEOPENIA AFTER MENOPAUSE: Status: ACTIVE | Noted: 2021-04-15

## 2021-04-15 PROCEDURE — 3725F SCREEN DEPRESSION PERFORMED: CPT | Performed by: INTERNAL MEDICINE

## 2021-04-15 PROCEDURE — 99214 OFFICE O/P EST MOD 30 MIN: CPT | Performed by: INTERNAL MEDICINE

## 2021-04-15 PROCEDURE — 3008F BODY MASS INDEX DOCD: CPT | Performed by: INTERNAL MEDICINE

## 2021-04-15 PROCEDURE — 1036F TOBACCO NON-USER: CPT | Performed by: INTERNAL MEDICINE

## 2021-04-15 RX ORDER — OMEPRAZOLE 20 MG/1
20 CAPSULE, DELAYED RELEASE ORAL DAILY
COMMUNITY
End: 2021-11-26

## 2021-04-15 NOTE — PROGRESS NOTES
Assessment/Plan:             1  Lower esophageal ring (Schatzki)    2  Mixed hyperlipidemia    3  Gastroesophageal reflux disease, unspecified whether esophagitis present    4  Lumbar disc herniation with radiculopathy    5  Migraine without aura and without status migrainosus, not intractable    6  Mild intermittent asthma without complication    7  Post-menopausal osteoporosis  -     DXA bone density spine hip and pelvis; Future; Expected date: 04/15/2021           Subjective:      Patient ID: Rhona Stoddard is a 64 y o  female  Follow-up on dysphagia, had the EGD done, showed the ring, also had a colonoscopy, discussed with her      The following portions of the patient's history were reviewed and updated as appropriate: She  has a past medical history of Allergic rhinitis, Anxiety and depression, Asthma, DDD (degenerative disc disease), lumbar, Factor 5 Leiden mutation, heterozygous ([facility] [address] ), Migraine, and Seizures ([facility] [address] )  She   Patient Active Problem List    Diagnosis Date Noted    Lower esophageal ring (Schatzki) 04/15/2021    Osteopenia after menopause 04/15/2021    Post-menopausal osteoporosis 04/15/2021    Mixed hyperlipidemia 04/15/2021    Other dysphagia 2021    Need for hepatitis C screening test 2021    Gastroesophageal reflux disease 2021    Mild intermittent asthma without complication     Migraine without aura and without status migrainosus, not intractable 10/02/2020    Lumbar disc herniation with radiculopathy 10/02/2020    Disorder of rotator cuff, right 10/02/2020    Dysthymic disorder 10/02/2020    Encounter for general adult medical examination with abnormal findings 10/02/2020    Body mass index 26 0-26 9, adult 10/02/2020    Fatigue 10/02/2020    Asthma 2013     She  has a past surgical history that includes  section; AUGMENTATION BREAST; Endometrial ablation; Tubal ligation; Abdominoplasty; Cervical cone biopsy ();  Rotator cuff repair; Tonsillectomy; and Mammo (historical) (2010)  Her family history includes Bipolar disorder in her son; Cancer in her maternal grandfather, maternal grandmother, mother, paternal grandfather, and paternal grandmother; Diabetes in her father; Heart disease in her father; Hypertension in her brother and father; Lung cancer in her mother; Post-traumatic stress disorder in her son; Rheum arthritis in her father; Stroke in her father; Thyroid disease in her father and sister  She  reports that she has never smoked  She has never used smokeless tobacco  She reports that she does not drink alcohol or use drugs  Current Outpatient Medications   Medication Sig Dispense Refill    albuterol (PROVENTIL HFA,VENTOLIN HFA) 90 mcg/act inhaler Inhale 2 puffs every 6 (six) hours as needed for wheezing 1 Inhaler 0    aspirin (ASPIRIN LOW STRENGTH) 81 mg chewable tablet Chew      cholecalciferol (VITAMIN D3) 1,000 units tablet Take 1,000 Units by mouth daily      cyanocobalamin (VITAMIN B-12) 500 mcg tablet Take 500 mcg by mouth daily      famotidine-calcium carbonate-magnesium hydroxide (PEPCID COMPLETE) -165 MG CHEW Chew 1 tablet daily as needed for heartburn      Loratadine 10 MG CAPS Take by mouth      Melatonin 1 MG CAPS Take 1 mg by mouth      Multiple Vitamins-Minerals (PRESERVISION AREDS PO) Take by mouth      omeprazole (PriLOSEC) 20 mg delayed release capsule Take 20 mg by mouth daily      cetirizine (ZyrTEC) 10 mg tablet Take by mouth       No current facility-administered medications for this visit        Current Outpatient Medications on File Prior to Visit   Medication Sig    albuterol (PROVENTIL HFA,VENTOLIN HFA) 90 mcg/act inhaler Inhale 2 puffs every 6 (six) hours as needed for wheezing    aspirin (ASPIRIN LOW STRENGTH) 81 mg chewable tablet Chew    cholecalciferol (VITAMIN D3) 1,000 units tablet Take 1,000 Units by mouth daily    cyanocobalamin (VITAMIN B-12) 500 mcg tablet Take 500 mcg by mouth daily    famotidine-calcium carbonate-magnesium hydroxide (PEPCID COMPLETE) -165 MG CHEW Chew 1 tablet daily as needed for heartburn    Loratadine 10 MG CAPS Take by mouth    Melatonin 1 MG CAPS Take 1 mg by mouth    Multiple Vitamins-Minerals (PRESERVISION AREDS PO) Take by mouth    omeprazole (PriLOSEC) 20 mg delayed release capsule Take 20 mg by mouth daily    cetirizine (ZyrTEC) 10 mg tablet Take by mouth    [DISCONTINUED] esomeprazole (NexIUM) 40 MG capsule Take 1 capsule (40 mg total) by mouth daily (Patient not taking: Reported on 4/15/2021)    [DISCONTINUED] Magnesium 250 MG TABS Take by mouth    [DISCONTINUED] methylPREDNISolone 4 MG tablet therapy pack Use as directed on package (Patient not taking: Reported on 4/15/2021)    [DISCONTINUED] neomycin-polymyxin-hydrocortisone (CORTISPORIN) otic solution Administer 4 drops to the right ear every 6 (six) hours (Patient not taking: Reported on 11/25/2020)     No current facility-administered medications on file prior to visit  She is allergic to carbamazepine; phenytoin; sulfur; and succinylcholine       Review of Systems   Constitutional: Negative for chills and fever  HENT: Negative for congestion, ear pain and sore throat  Eyes: Negative for pain  Respiratory: Negative for cough and shortness of breath  Cardiovascular: Negative for chest pain and leg swelling  Gastrointestinal: Negative for abdominal pain, nausea and vomiting  Endocrine: Negative for polyuria  Genitourinary: Negative for difficulty urinating, frequency and urgency  Musculoskeletal: Negative for arthralgias and back pain  Skin: Negative for rash  Neurological: Negative for weakness and headaches  Psychiatric/Behavioral: Negative for sleep disturbance  The patient is not nervous/anxious            Objective:      /78   Pulse 80   Temp 97 7 °F (36 5 °C)   Ht 5' 5" (1 651 m)   Wt 71 2 kg (157 lb)   BMI 26 13 kg/m²     No results found for this or any previous visit (from the past 1344 hour(s))  Physical Exam  Constitutional:       Appearance: Normal appearance  HENT:      Head: Normocephalic  Right Ear: Tympanic membrane, ear canal and external ear normal       Left Ear: Tympanic membrane, ear canal and external ear normal       Nose: Nose normal  No congestion  Mouth/Throat:      Mouth: Mucous membranes are moist       Pharynx: Oropharynx is clear  No oropharyngeal exudate or posterior oropharyngeal erythema  Eyes:      Extraocular Movements: Extraocular movements intact  Conjunctiva/sclera: Conjunctivae normal       Pupils: Pupils are equal, round, and reactive to light  Neck:      Musculoskeletal: Normal range of motion and neck supple  Cardiovascular:      Rate and Rhythm: Normal rate and regular rhythm  Heart sounds: Normal heart sounds  No murmur  Pulmonary:      Effort: Pulmonary effort is normal       Breath sounds: Normal breath sounds  No wheezing or rales  Abdominal:      General: Bowel sounds are normal  There is no distension  Palpations: Abdomen is soft  Tenderness: There is no abdominal tenderness  Musculoskeletal: Normal range of motion  Right lower leg: No edema  Left lower leg: No edema  Lymphadenopathy:      Cervical: No cervical adenopathy  Skin:     General: Skin is warm  Neurological:      General: No focal deficit present  Mental Status: She is alert and oriented to person, place, and time

## 2021-05-17 ENCOUNTER — TELEPHONE (OUTPATIENT)
Dept: INTERNAL MEDICINE CLINIC | Facility: CLINIC | Age: 56
End: 2021-05-17

## 2021-05-17 DIAGNOSIS — K21.9 GASTROESOPHAGEAL REFLUX DISEASE, UNSPECIFIED WHETHER ESOPHAGITIS PRESENT: Primary | ICD-10-CM

## 2021-05-17 RX ORDER — FAMOTIDINE 20 MG/1
20 TABLET, FILM COATED ORAL 2 TIMES DAILY
Qty: 60 TABLET | Refills: 1 | Status: SHIPPED | OUTPATIENT
Start: 2021-05-17 | End: 2021-07-12

## 2021-05-17 NOTE — TELEPHONE ENCOUNTER
PT SAYS SHE WAS OUTSIDE AND GOT BURNED FROM THE SUN TWICE  THEY WERE ODD BURNS SHE SAYS IT COULDVE BEEN POSSIBLE SUN POISONING  PT SAYS SHE DID RESEARCH BECAUSE SHE TAKES OMEPRAZOLE AND SAYS THAT CAN BE A SIDE EFFECT  SHE WANTS A ALTERNATIVE FOR OMEPRAZOLE THAT DOES NOT HAVE THIS SIDE EFFECT

## 2021-07-11 DIAGNOSIS — K21.9 GASTROESOPHAGEAL REFLUX DISEASE, UNSPECIFIED WHETHER ESOPHAGITIS PRESENT: ICD-10-CM

## 2021-07-12 RX ORDER — FAMOTIDINE 20 MG/1
TABLET, FILM COATED ORAL
Qty: 180 TABLET | Refills: 1 | Status: SHIPPED | OUTPATIENT
Start: 2021-07-12 | End: 2022-03-01 | Stop reason: SDUPTHER

## 2021-11-04 ENCOUNTER — CLINICAL SUPPORT (OUTPATIENT)
Dept: INTERNAL MEDICINE CLINIC | Facility: CLINIC | Age: 56
End: 2021-11-04
Payer: COMMERCIAL

## 2021-11-04 DIAGNOSIS — Z23 NEEDS FLU SHOT: Primary | ICD-10-CM

## 2021-11-04 PROCEDURE — 90682 RIV4 VACC RECOMBINANT DNA IM: CPT

## 2021-11-04 PROCEDURE — 90471 IMMUNIZATION ADMIN: CPT

## 2021-11-26 ENCOUNTER — ANNUAL EXAM (OUTPATIENT)
Dept: OBGYN CLINIC | Facility: CLINIC | Age: 56
End: 2021-11-26
Payer: COMMERCIAL

## 2021-11-26 VITALS
SYSTOLIC BLOOD PRESSURE: 112 MMHG | BODY MASS INDEX: 26.16 KG/M2 | HEIGHT: 65 IN | DIASTOLIC BLOOD PRESSURE: 70 MMHG | WEIGHT: 157 LBS

## 2021-11-26 DIAGNOSIS — Z01.419 WOMEN'S ANNUAL ROUTINE GYNECOLOGICAL EXAMINATION: Primary | ICD-10-CM

## 2021-11-26 DIAGNOSIS — N89.8 VAGINAL DRYNESS: ICD-10-CM

## 2021-11-26 DIAGNOSIS — Z12.31 ENCOUNTER FOR SCREENING MAMMOGRAM FOR BREAST CANCER: ICD-10-CM

## 2021-11-26 DIAGNOSIS — N91.2 AMENORRHEA: ICD-10-CM

## 2021-11-26 PROCEDURE — 99396 PREV VISIT EST AGE 40-64: CPT | Performed by: NURSE PRACTITIONER

## 2021-12-01 ENCOUNTER — OFFICE VISIT (OUTPATIENT)
Dept: INTERNAL MEDICINE CLINIC | Facility: CLINIC | Age: 56
End: 2021-12-01
Payer: COMMERCIAL

## 2021-12-01 VITALS
SYSTOLIC BLOOD PRESSURE: 132 MMHG | TEMPERATURE: 97.8 F | WEIGHT: 157.2 LBS | HEIGHT: 65 IN | BODY MASS INDEX: 26.19 KG/M2 | DIASTOLIC BLOOD PRESSURE: 82 MMHG | OXYGEN SATURATION: 100 % | HEART RATE: 83 BPM

## 2021-12-01 DIAGNOSIS — E78.2 MIXED HYPERLIPIDEMIA: ICD-10-CM

## 2021-12-01 DIAGNOSIS — R53.83 OTHER FATIGUE: ICD-10-CM

## 2021-12-01 DIAGNOSIS — K21.9 GASTROESOPHAGEAL REFLUX DISEASE, UNSPECIFIED WHETHER ESOPHAGITIS PRESENT: ICD-10-CM

## 2021-12-01 DIAGNOSIS — M51.16 LUMBAR DISC HERNIATION WITH RADICULOPATHY: ICD-10-CM

## 2021-12-01 DIAGNOSIS — Z00.00 ANNUAL PHYSICAL EXAM: Primary | ICD-10-CM

## 2021-12-01 DIAGNOSIS — J45.20 MILD INTERMITTENT ASTHMA WITHOUT COMPLICATION: ICD-10-CM

## 2021-12-01 PROCEDURE — 3008F BODY MASS INDEX DOCD: CPT | Performed by: INTERNAL MEDICINE

## 2021-12-01 PROCEDURE — 3725F SCREEN DEPRESSION PERFORMED: CPT | Performed by: INTERNAL MEDICINE

## 2021-12-01 PROCEDURE — 1036F TOBACCO NON-USER: CPT | Performed by: INTERNAL MEDICINE

## 2021-12-01 PROCEDURE — 99396 PREV VISIT EST AGE 40-64: CPT | Performed by: INTERNAL MEDICINE

## 2021-12-16 ENCOUNTER — TELEPHONE (OUTPATIENT)
Dept: OBGYN CLINIC | Facility: CLINIC | Age: 56
End: 2021-12-16

## 2021-12-16 DIAGNOSIS — N89.8 VAGINAL DRYNESS: Primary | ICD-10-CM

## 2021-12-31 ENCOUNTER — APPOINTMENT (OUTPATIENT)
Dept: LAB | Age: 56
End: 2021-12-31
Payer: COMMERCIAL

## 2021-12-31 DIAGNOSIS — E78.2 MIXED HYPERLIPIDEMIA: ICD-10-CM

## 2021-12-31 DIAGNOSIS — R53.83 OTHER FATIGUE: ICD-10-CM

## 2021-12-31 DIAGNOSIS — Z00.00 ANNUAL PHYSICAL EXAM: ICD-10-CM

## 2021-12-31 DIAGNOSIS — N91.2 AMENORRHEA: ICD-10-CM

## 2021-12-31 DIAGNOSIS — Z11.59 NEED FOR HEPATITIS C SCREENING TEST: ICD-10-CM

## 2021-12-31 LAB
ALBUMIN SERPL BCP-MCNC: 3.7 G/DL (ref 3.5–5)
ALP SERPL-CCNC: 55 U/L (ref 46–116)
ALT SERPL W P-5'-P-CCNC: 23 U/L (ref 12–78)
ANION GAP SERPL CALCULATED.3IONS-SCNC: 4 MMOL/L (ref 4–13)
AST SERPL W P-5'-P-CCNC: 8 U/L (ref 5–45)
BASOPHILS # BLD AUTO: 0.05 THOUSANDS/ΜL (ref 0–0.1)
BASOPHILS NFR BLD AUTO: 1 % (ref 0–1)
BILIRUB SERPL-MCNC: 0.51 MG/DL (ref 0.2–1)
BUN SERPL-MCNC: 15 MG/DL (ref 5–25)
CALCIUM SERPL-MCNC: 9.2 MG/DL (ref 8.3–10.1)
CHLORIDE SERPL-SCNC: 107 MMOL/L (ref 100–108)
CHOLEST SERPL-MCNC: 267 MG/DL
CO2 SERPL-SCNC: 29 MMOL/L (ref 21–32)
CREAT SERPL-MCNC: 0.71 MG/DL (ref 0.6–1.3)
EOSINOPHIL # BLD AUTO: 0.28 THOUSAND/ΜL (ref 0–0.61)
EOSINOPHIL NFR BLD AUTO: 5 % (ref 0–6)
ERYTHROCYTE [DISTWIDTH] IN BLOOD BY AUTOMATED COUNT: 12.5 % (ref 11.6–15.1)
GFR SERPL CREATININE-BSD FRML MDRD: 95 ML/MIN/1.73SQ M
GLUCOSE P FAST SERPL-MCNC: 98 MG/DL (ref 65–99)
HCT VFR BLD AUTO: 44.9 % (ref 34.8–46.1)
HCV AB SER QL: NORMAL
HDLC SERPL-MCNC: 81 MG/DL
HGB BLD-MCNC: 14.4 G/DL (ref 11.5–15.4)
IMM GRANULOCYTES # BLD AUTO: 0 THOUSAND/UL (ref 0–0.2)
IMM GRANULOCYTES NFR BLD AUTO: 0 % (ref 0–2)
LDLC SERPL CALC-MCNC: 175 MG/DL (ref 0–100)
LYMPHOCYTES # BLD AUTO: 2.28 THOUSANDS/ΜL (ref 0.6–4.47)
LYMPHOCYTES NFR BLD AUTO: 40 % (ref 14–44)
MCH RBC QN AUTO: 28 PG (ref 26.8–34.3)
MCHC RBC AUTO-ENTMCNC: 32.1 G/DL (ref 31.4–37.4)
MCV RBC AUTO: 87 FL (ref 82–98)
MONOCYTES # BLD AUTO: 0.5 THOUSAND/ΜL (ref 0.17–1.22)
MONOCYTES NFR BLD AUTO: 9 % (ref 4–12)
NEUTROPHILS # BLD AUTO: 2.56 THOUSANDS/ΜL (ref 1.85–7.62)
NEUTS SEG NFR BLD AUTO: 45 % (ref 43–75)
NRBC BLD AUTO-RTO: 0 /100 WBCS
PLATELET # BLD AUTO: 200 THOUSANDS/UL (ref 149–390)
PMV BLD AUTO: 9.4 FL (ref 8.9–12.7)
POTASSIUM SERPL-SCNC: 4.2 MMOL/L (ref 3.5–5.3)
PROT SERPL-MCNC: 7 G/DL (ref 6.4–8.2)
RBC # BLD AUTO: 5.15 MILLION/UL (ref 3.81–5.12)
SODIUM SERPL-SCNC: 140 MMOL/L (ref 136–145)
TRIGL SERPL-MCNC: 56 MG/DL
TSH SERPL DL<=0.05 MIU/L-ACNC: 3.6 UIU/ML (ref 0.36–3.74)
WBC # BLD AUTO: 5.67 THOUSAND/UL (ref 4.31–10.16)

## 2021-12-31 PROCEDURE — 82672 ASSAY OF ESTROGEN: CPT

## 2021-12-31 PROCEDURE — 80053 COMPREHEN METABOLIC PANEL: CPT

## 2021-12-31 PROCEDURE — 36415 COLL VENOUS BLD VENIPUNCTURE: CPT

## 2021-12-31 PROCEDURE — 86803 HEPATITIS C AB TEST: CPT

## 2021-12-31 PROCEDURE — 84443 ASSAY THYROID STIM HORMONE: CPT

## 2021-12-31 PROCEDURE — 80061 LIPID PANEL: CPT

## 2021-12-31 PROCEDURE — 85025 COMPLETE CBC W/AUTO DIFF WBC: CPT

## 2022-01-04 ENCOUNTER — TELEPHONE (OUTPATIENT)
Dept: INTERNAL MEDICINE CLINIC | Facility: CLINIC | Age: 57
End: 2022-01-04

## 2022-01-04 DIAGNOSIS — R53.81 MALAISE: Primary | ICD-10-CM

## 2022-01-04 LAB — ESTROGEN SERPL-MCNC: 185 PG/ML

## 2022-01-04 NOTE — TELEPHONE ENCOUNTER
Pt called and stated she got the booster and has been having flu like symptoms ever since, her whole body is achy and she had a headache so bad last night she almost went to the ER, she was wondering if you can place a covid/ flu test

## 2022-01-07 ENCOUNTER — TELEPHONE (OUTPATIENT)
Dept: INTERNAL MEDICINE CLINIC | Facility: CLINIC | Age: 57
End: 2022-01-07

## 2022-01-07 NOTE — TELEPHONE ENCOUNTER
Pt had a covid test done which was negative, she got her booster shot on 1/1 and has severe fatigue, headache and leg aches, and her arms are numb, she was wondering if these are symptoms of the booster or something else, she would like to talk to you

## 2022-01-07 NOTE — TELEPHONE ENCOUNTER
Discussed with her, if she is not better by Monday we need to redo the tests, also blood test discussed with her, diet and exercise, repeat blood test done in 4 months,  blood test also discussed with her, same thing diet and exercise for him also

## 2022-01-12 ENCOUNTER — TELEPHONE (OUTPATIENT)
Dept: OBGYN CLINIC | Facility: CLINIC | Age: 57
End: 2022-01-12

## 2022-01-12 DIAGNOSIS — Z79.890 HORMONE REPLACEMENT THERAPY (POSTMENOPAUSAL): Primary | ICD-10-CM

## 2022-02-16 DIAGNOSIS — Z79.890 HORMONE REPLACEMENT THERAPY (POSTMENOPAUSAL): Primary | ICD-10-CM

## 2022-02-16 NOTE — TELEPHONE ENCOUNTER
Spoke with patient - her estrogen level falls within the post-menopausal range  She is interested in bioidentical hormone therapy  I recommended she have a consultation with one of our other network providers - Dr Dilan Burnett who can better assist her with this

## 2022-02-24 ENCOUNTER — OFFICE VISIT (OUTPATIENT)
Dept: OBGYN CLINIC | Facility: CLINIC | Age: 57
End: 2022-02-24
Payer: COMMERCIAL

## 2022-02-24 VITALS
DIASTOLIC BLOOD PRESSURE: 80 MMHG | BODY MASS INDEX: 26.16 KG/M2 | SYSTOLIC BLOOD PRESSURE: 122 MMHG | WEIGHT: 157 LBS | HEIGHT: 65 IN

## 2022-02-24 DIAGNOSIS — N89.8 VAGINAL DRYNESS: ICD-10-CM

## 2022-02-24 PROCEDURE — 99213 OFFICE O/P EST LOW 20 MIN: CPT | Performed by: NURSE PRACTITIONER

## 2022-02-24 RX ORDER — OSPEMIFENE 60 MG/1
TABLET, FILM COATED ORAL
Qty: 90 TABLET | Refills: 3 | Status: SHIPPED | OUTPATIENT
Start: 2022-02-24

## 2022-02-24 NOTE — PROGRESS NOTES
Nelida Gentle 64year-old  here for follow-up after initiating [employer] in November for vaginal dryness  She states she has noticed that the vagina is somewhat lubricated  She has not attempted intimacy for fear of experiencing discomfort  Encouraged patient to initiated intimacy slowly until she feels comfortable and to use additional lubrication if needed  She will also be scheduling an appt with Dr Alejandra Allen for HRT options  ROS:  As indicated in HPI  All other ROS negative  Physical Exam  Constitutional:       Appearance: Normal appearance  Genitourinary:      Right Labia: No rash, tenderness, lesions, skin changes or Bartholin's cyst      Left Labia: No tenderness, lesions, skin changes, Bartholin's cyst or rash  No labial fusion noted  No vaginal discharge, erythema, tenderness, bleeding, ulceration or granulation tissue  Vaginal exam comments: There is improvement noted in the amount of normal vaginal secretions as compared to her last visit        Musculoskeletal:      Cervical back: Neck supple  Neurological:      Mental Status: She is alert  Skin:     General: Skin is warm  Vitals and nursing note reviewed  Lety Arredondo was seen today for follow-up  Diagnoses and all orders for this visit:    Vaginal dryness  -     Ospemifene 60 MG TABS; Take 1 tablet (60 mg total) by mouth in the morning      Continue Osphena  Return for annual visit or sooner if needed

## 2022-03-01 DIAGNOSIS — K21.9 GASTROESOPHAGEAL REFLUX DISEASE, UNSPECIFIED WHETHER ESOPHAGITIS PRESENT: ICD-10-CM

## 2022-03-02 RX ORDER — FAMOTIDINE 20 MG/1
20 TABLET, FILM COATED ORAL 2 TIMES DAILY
Qty: 180 TABLET | Refills: 1 | Status: SHIPPED | OUTPATIENT
Start: 2022-03-02

## 2022-03-17 ENCOUNTER — TELEPHONE (OUTPATIENT)
Dept: INTERNAL MEDICINE CLINIC | Facility: CLINIC | Age: 57
End: 2022-03-17

## 2022-03-17 DIAGNOSIS — F41.9 ANXIETY: Primary | ICD-10-CM

## 2022-03-17 RX ORDER — ALPRAZOLAM 0.25 MG/1
0.25 TABLET ORAL
Qty: 10 TABLET | Refills: 0 | Status: SHIPPED | OUTPATIENT
Start: 2022-03-17

## 2022-03-17 NOTE — TELEPHONE ENCOUNTER
Pt is needing something for anxiety  Someone is poisoning her dogs, police is involved and looking into the situation  Pt can't sleep at night, worried about her dogs  Pt is requesting medication to help her through this stressful situation

## 2022-06-24 ENCOUNTER — TELEPHONE (OUTPATIENT)
Dept: INTERNAL MEDICINE CLINIC | Facility: CLINIC | Age: 57
End: 2022-06-24

## 2022-08-03 ENCOUNTER — TELEPHONE (OUTPATIENT)
Dept: INTERNAL MEDICINE CLINIC | Facility: CLINIC | Age: 57
End: 2022-08-03

## 2022-08-03 NOTE — TELEPHONE ENCOUNTER
Patient called and was wanting to know what appointment for today was for , she though might be for follow up for medication she was given but she is not taking it so does not know if she needs to have this follow up appointment

## 2022-10-11 ENCOUNTER — TELEPHONE (OUTPATIENT)
Dept: INTERNAL MEDICINE CLINIC | Facility: CLINIC | Age: 57
End: 2022-10-11

## 2022-10-11 DIAGNOSIS — M51.16 LUMBAR DISC HERNIATION WITH RADICULOPATHY: Primary | ICD-10-CM

## 2022-10-11 RX ORDER — METHYLPREDNISOLONE 4 MG/1
TABLET ORAL
Qty: 21 EACH | Refills: 0 | Status: SHIPPED | OUTPATIENT
Start: 2022-10-11

## 2022-10-11 NOTE — TELEPHONE ENCOUNTER
patient called and stated her back went out this morning in a lot of pain and you usually call her in a steroid pack , she asked if you could send into [facility] [place] on [address]

## 2022-10-12 PROBLEM — Z11.59 NEED FOR HEPATITIS C SCREENING TEST: Status: RESOLVED | Noted: 2021-01-20 | Resolved: 2022-10-12

## 2022-11-02 ENCOUNTER — ANNUAL EXAM (OUTPATIENT)
Dept: OBGYN CLINIC | Facility: CLINIC | Age: 57
End: 2022-11-02

## 2022-11-02 VITALS
DIASTOLIC BLOOD PRESSURE: 70 MMHG | SYSTOLIC BLOOD PRESSURE: 112 MMHG | WEIGHT: 154 LBS | HEIGHT: 65 IN | BODY MASS INDEX: 25.66 KG/M2

## 2022-11-02 DIAGNOSIS — N95.2 VAGINAL ATROPHY: ICD-10-CM

## 2022-11-02 DIAGNOSIS — N94.10 DYSPAREUNIA IN FEMALE: ICD-10-CM

## 2022-11-02 DIAGNOSIS — Z01.419 ENCOUNTER FOR ANNUAL ROUTINE GYNECOLOGICAL EXAMINATION: Primary | ICD-10-CM

## 2022-11-02 DIAGNOSIS — Z12.31 ENCOUNTER FOR SCREENING MAMMOGRAM FOR MALIGNANT NEOPLASM OF BREAST: ICD-10-CM

## 2022-11-02 RX ORDER — PRASTERONE 6.5 MG/1
INSERT VAGINAL
Qty: 28 EACH | Refills: 2 | Status: SHIPPED | OUTPATIENT
Start: 2022-11-02 | End: 2022-11-03

## 2022-11-02 RX ORDER — PRASTERONE 6.5 MG/1
INSERT VAGINAL
Qty: 28 EACH | Refills: 2 | Status: SHIPPED | OUTPATIENT
Start: 2022-11-02 | End: 2022-11-02

## 2022-11-02 NOTE — PROGRESS NOTES
Assessment/Plan:   Pap smear done for cytology, reflex HPV  Encouraged self-breast examination as well as calcium supplementation  Continue annual mammogram   Reviewed colon cancer screening, up-to-date  Discussed treatment options for dyspareunia/vaginal atrophy (history of factor 5 Leiden) including vaginal moisturizer verses intrarosa (daily)  She will check see if this is covered by her insurance  Discussed once improve vaginal atrophy, will consider pelvic floor physical therapy  She will return to office in 3 months for follow-up or p r n     All questions answered  No problem-specific Assessment & Plan notes found for this encounter  Diagnoses and all orders for this visit:    Encounter for annual routine gynecological examination    Encounter for screening mammogram for malignant neoplasm of breast  -     Mammo screening bilateral w 3d & cad; Future    Vaginal atrophy  -     Prasterone 6 5 MG INST; Insert 6 5 mg into the vagina 3 (three) times a week          Subjective:      Patient ID: Danilo Griffin is a 62 y o  female  HPI     This is a 14-year-old female  ( x1,  x1 with tubal ligation) presents for her gyn exam   Patient underwent NovaSure ablation in age 40  She has been amenorrheic since then  Her hot flashes and night sweats have resolved  There has been no vaginal bleeding spotting  No changes in bowel or bladder function  She has been in a monogamous relationship with her  for over 40 years  She underwent cone biopsy at age 27  Pap smears have been normal since then  Her medical history significant for factor 5 Leiden diagnosed approximately 13 years ago, no history of thrombosis  She has used vaginal moisturizer with minimal improvement  She also usedosphena for 3 months with minimal improvement  There is postcoital spotting due to significant dryness  Patient underwent colonoscopy , normal with follow-up in 10 years    She is up-to-date with her mammogram     She does follow up with her family physician on a regular basis  The following portions of the patient's history were reviewed and updated as appropriate: allergies, current medications, past family history, past medical history, past social history, past surgical history and problem list     Review of Systems   Constitutional: Negative for fatigue, fever and unexpected weight change  Respiratory: Negative for cough, chest tightness, shortness of breath and wheezing  Cardiovascular: Negative  Negative for chest pain and palpitations  Gastrointestinal: Negative  Negative for abdominal distention, abdominal pain, blood in stool, constipation, diarrhea, nausea and vomiting  Genitourinary: Negative  Negative for difficulty urinating, dyspareunia, dysuria, flank pain, frequency, genital sores, hematuria, pelvic pain, urgency, vaginal bleeding, vaginal discharge and vaginal pain  Skin: Negative for rash  Objective:      /70   Ht 5' 5" (1 651 m)   Wt 69 9 kg (154 lb)   LMP  (LMP Unknown)   BMI 25 63 kg/m²          Physical Exam  Constitutional:       Appearance: Normal appearance  She is well-developed  Cardiovascular:      Rate and Rhythm: Normal rate and regular rhythm  Pulmonary:      Effort: Pulmonary effort is normal       Breath sounds: Normal breath sounds  Chest:   Breasts:      Right: No inverted nipple, mass, nipple discharge, skin change or tenderness  Left: No inverted nipple, mass, nipple discharge, skin change or tenderness  Abdominal:      General: Bowel sounds are normal  There is no distension  Palpations: Abdomen is soft  Tenderness: There is no abdominal tenderness  There is no guarding or rebound  Genitourinary:     Labia:         Right: No rash, tenderness or lesion  Left: No rash, tenderness or lesion  Vagina: Normal  No signs of injury  No vaginal discharge or tenderness        Cervix: No cervical motion tenderness, discharge, friability, lesion, erythema or cervical bleeding  Uterus: Not enlarged and not tender  Adnexa:         Right: No mass, tenderness or fullness  Left: No mass, tenderness or fullness  Neurological:      Mental Status: She is alert and oriented to person, place, and time  external genitalia is evident of vaginal atrophy  The vagina is evident of estrogen deficiency  The cervix this distorted due to prior surgery  No pelvic floor prolapse

## 2022-11-03 DIAGNOSIS — N95.2 VAGINAL ATROPHY: ICD-10-CM

## 2022-11-03 DIAGNOSIS — N94.10 DYSPAREUNIA IN FEMALE: ICD-10-CM

## 2022-11-03 RX ORDER — PRASTERONE 6.5 MG/1
INSERT VAGINAL
Qty: 28 EACH | Refills: 2 | Status: SHIPPED | OUTPATIENT
Start: 2022-11-03

## 2022-11-03 RX ORDER — PRASTERONE 6.5 MG/1
INSERT VAGINAL
Qty: 28 EACH | Refills: 2 | Status: SHIPPED | OUTPATIENT
Start: 2022-11-03 | End: 2022-11-03

## 2023-01-26 ENCOUNTER — TELEPHONE (OUTPATIENT)
Dept: OBGYN CLINIC | Facility: CLINIC | Age: 58
End: 2023-01-26

## 2023-01-26 NOTE — TELEPHONE ENCOUNTER
Pt  Is calling to let you know she started the Intrarosa medication on 12/1 and that it is working  She asked if she can also start taking revaree         # [phone]

## 2023-02-21 DIAGNOSIS — N95.2 VAGINAL ATROPHY: ICD-10-CM

## 2023-02-21 DIAGNOSIS — N94.10 DYSPAREUNIA IN FEMALE: ICD-10-CM

## 2023-02-21 RX ORDER — PRASTERONE 6.5 MG/1
INSERT VAGINAL
Qty: 28 EACH | Refills: 2 | Status: SHIPPED | OUTPATIENT
Start: 2023-02-21

## 2023-03-08 ENCOUNTER — OFFICE VISIT (OUTPATIENT)
Dept: INTERNAL MEDICINE CLINIC | Facility: CLINIC | Age: 58
End: 2023-03-08

## 2023-03-08 VITALS
TEMPERATURE: 98.2 F | BODY MASS INDEX: 26.33 KG/M2 | OXYGEN SATURATION: 99 % | HEIGHT: 65 IN | DIASTOLIC BLOOD PRESSURE: 76 MMHG | WEIGHT: 158 LBS | HEART RATE: 63 BPM | SYSTOLIC BLOOD PRESSURE: 132 MMHG

## 2023-03-08 DIAGNOSIS — L30.8 OTHER ECZEMA: Primary | ICD-10-CM

## 2023-03-08 DIAGNOSIS — J45.20 MILD INTERMITTENT ASTHMA WITHOUT COMPLICATION: ICD-10-CM

## 2023-03-08 PROBLEM — L30.9 ECZEMA: Status: ACTIVE | Noted: 2023-03-08

## 2023-03-08 RX ORDER — TRIAMCINOLONE ACETONIDE 1 MG/G
CREAM TOPICAL 2 TIMES DAILY
Qty: 30 G | Refills: 0 | Status: SHIPPED | OUTPATIENT
Start: 2023-03-08

## 2023-03-08 NOTE — PROGRESS NOTES
Assessment/Plan:    BMI Counseling: Body mass index is 26 29 kg/m²  The BMI is above normal  Nutrition recommendations include decreasing portion sizes, encouraging healthy choices of fruits and vegetables and decreasing fast food intake  Exercise recommendations include moderate physical activity 150 minutes/week  Rationale for BMI follow-up plan is due to patient being overweight or obese  1  Other eczema  -     triamcinolone (KENALOG) 0 1 % cream; Apply topically 2 (two) times a day    2  Mild intermittent asthma without complication         Subjective:      Patient ID: Nadia German is a 62 y o  female  Ear skin dry, crackling, mild painful,      The following portions of the patient's history were reviewed and updated as appropriate: She  has a past medical history of Abnormal Pap smear of cervix, Allergic rhinitis, Anxiety and depression, Asthma, DDD (degenerative disc disease), lumbar, Factor 5 Leiden mutation, heterozygous ([facility] [address] ), Migraine, and [facility] )    She   Patient Active Problem List    Diagnosis Date Noted   • Eczema 2023   • Lower esophageal ring (Schatzki) 04/15/2021   • Osteopenia after menopause 04/15/2021   • Post-menopausal osteoporosis 04/15/2021   • Mixed hyperlipidemia 04/15/2021   • Other dysphagia 2021   • Gastroesophageal reflux disease 2021   • Mild intermittent asthma without complication    • Migraine without aura and without status migrainosus, not intractable 10/02/2020   • Lumbar disc herniation with radiculopathy 10/02/2020   • Disorder of rotator cuff, right 10/02/2020   • Dysthymic disorder 10/02/2020   • Annual physical exam 10/02/2020   • Body mass index 26 0-26 9, adult 10/02/2020   • Fatigue 10/02/2020   • Asthma 2013   • Factor V deficiency ([facility] [address] ) 2013   • Exudative senile macular degeneration of retina ([facility] [address] ) 2013     She  has a past surgical history that includes  section; AUGMENTATION BREAST; Endometrial ablation; Tubal ligation; Abdominoplasty; Cervical cone biopsy (1995); Rotator cuff repair; Tonsillectomy; and Mammo (historical) (2010)  Her family history includes Bipolar disorder in her son; Cancer in her maternal grandfather, maternal grandmother, mother, paternal grandfather, and paternal grandmother; Diabetes in her father; Heart disease in her father; Hypertension in her brother and father; Lung cancer in her mother; Post-traumatic stress disorder in her son; Rheum arthritis in her father; Stroke in her father; Thyroid disease in her father and sister  She  reports that she quit smoking about 36 years ago  Her smoking use included cigarettes  She has never used smokeless tobacco  She reports that she does not drink alcohol and does not use drugs  Current Outpatient Medications   Medication Sig Dispense Refill   • albuterol (PROVENTIL HFA,VENTOLIN HFA) 90 mcg/act inhaler Inhale 2 puffs every 6 (six) hours as needed for wheezing 1 Inhaler 0   • aspirin 81 mg chewable tablet Chew     • cetirizine (ZyrTEC) 10 mg tablet Take by mouth     • triamcinolone (KENALOG) 0 1 % cream Apply topically 2 (two) times a day 30 g 0   • famotidine (PEPCID) 20 mg tablet Take 1 tablet (20 mg total) by mouth 2 (two) times a day (Patient not taking: Reported on 11/2/2022) 180 tablet 1   • Intrarosa 6 5 MG INST INSERT 6 5 MG INTO THE VAGINA DAILY AT BEDTIME (Patient not taking: Reported on 3/8/2023) 28 each 2     No current facility-administered medications for this visit       Current Outpatient Medications on File Prior to Visit   Medication Sig   • albuterol (PROVENTIL HFA,VENTOLIN HFA) 90 mcg/act inhaler Inhale 2 puffs every 6 (six) hours as needed for wheezing   • aspirin 81 mg chewable tablet Chew   • cetirizine (ZyrTEC) 10 mg tablet Take by mouth   • famotidine (PEPCID) 20 mg tablet Take 1 tablet (20 mg total) by mouth 2 (two) times a day (Patient not taking: Reported on 11/2/2022)   • Intrarosa 6 5 MG INST INSERT 6 5 MG INTO THE VAGINA DAILY AT BEDTIME (Patient not taking: Reported on 3/8/2023)   • [DISCONTINUED] ALPRAZolam (XANAX) 0 25 mg tablet Take 1 tablet (0 25 mg total) by mouth daily at bedtime as needed for anxiety (Patient not taking: Reported on 3/8/2023)   • [DISCONTINUED] Loratadine 10 MG CAPS Take by mouth   • [DISCONTINUED] methylPREDNISolone 4 MG tablet therapy pack Use as directed on package (Patient not taking: Reported on 11/2/2022)     No current facility-administered medications on file prior to visit  She is allergic to carbamazepine, elemental sulfur, phenytoin, and succinylcholine       Review of Systems   Constitutional: Negative for chills and fever  HENT: Negative for congestion, ear pain and sore throat  Eyes: Negative for pain  Respiratory: Negative for cough and shortness of breath  Cardiovascular: Negative for chest pain and leg swelling  Gastrointestinal: Negative for abdominal pain, nausea and vomiting  Endocrine: Negative for polyuria  Genitourinary: Negative for difficulty urinating, frequency and urgency  Musculoskeletal: Negative for arthralgias and back pain  Skin: Positive for rash  Neurological: Negative for weakness and headaches  Psychiatric/Behavioral: Negative for sleep disturbance  The patient is not nervous/anxious  Objective:      /76 (BP Location: Right arm, Patient Position: Sitting, Cuff Size: Standard)   Pulse 63   Temp 98 2 °F (36 8 °C) (Temporal)   Ht 5' 5" (1 651 m)   Wt 71 7 kg (158 lb)   LMP  (LMP Unknown)   SpO2 99%   BMI 26 29 kg/m²     No results found for this or any previous visit (from the past 1344 hour(s))  Physical Exam  Constitutional:       Appearance: Normal appearance  HENT:      Head: Normocephalic        Right Ear: Tympanic membrane and ear canal normal       Left Ear: Tympanic membrane and ear canal normal       Ears:      Comments: External ear bilateral at the opening dryness of the skin, no active discharge, minimal redness, nontender     Nose: Nose normal  No congestion  Mouth/Throat:      Mouth: Mucous membranes are moist       Pharynx: Oropharynx is clear  No oropharyngeal exudate or posterior oropharyngeal erythema  Eyes:      Extraocular Movements: Extraocular movements intact  Conjunctiva/sclera: Conjunctivae normal       Pupils: Pupils are equal, round, and reactive to light  Cardiovascular:      Rate and Rhythm: Normal rate and regular rhythm  Heart sounds: Normal heart sounds  No murmur heard  Pulmonary:      Effort: Pulmonary effort is normal       Breath sounds: Normal breath sounds  No wheezing or rales  Abdominal:      General: Bowel sounds are normal  There is no distension  Palpations: Abdomen is soft  Tenderness: There is no abdominal tenderness  Musculoskeletal:         General: Normal range of motion  Cervical back: Normal range of motion and neck supple  Right lower leg: No edema  Left lower leg: No edema  Lymphadenopathy:      Cervical: No cervical adenopathy  Skin:     General: Skin is warm  Neurological:      General: No focal deficit present  Mental Status: She is alert and oriented to person, place, and time

## 2023-03-12 ENCOUNTER — OFFICE VISIT (OUTPATIENT)
Dept: URGENT CARE | Age: 58
End: 2023-03-12

## 2023-03-12 VITALS
RESPIRATION RATE: 18 BRPM | WEIGHT: 156 LBS | TEMPERATURE: 97.5 F | BODY MASS INDEX: 25.99 KG/M2 | OXYGEN SATURATION: 99 % | HEIGHT: 65 IN | HEART RATE: 69 BPM

## 2023-03-12 DIAGNOSIS — B34.9 VIRAL SYNDROME: Primary | ICD-10-CM

## 2023-03-12 RX ORDER — CALCIUM CARBONATE 200(500)MG
1 TABLET,CHEWABLE ORAL DAILY
COMMUNITY

## 2023-03-12 NOTE — LETTER
March 12, 2023     Patient: Seng Shafer   YOB: 1965   Date of Visit: 3/12/2023       To Whom it May Concern:    Flores Irene was seen in my clinic on 3/12/2023  She may return on 3/14/2023  If you have any questions or concerns, please don't hesitate to call           Sincerely,          RHETT Carrizales        CC: No Recipients

## 2023-03-12 NOTE — PATIENT INSTRUCTIONS
COVID/flu cultures pending, results should be available in MyChart within 24 hours  Please continue Tylenol/ibuprofen, over-the-counter decongestants and humidifier/hot shower steam as desired for symptom relief  Rest and ensure adequate hydration  Symptoms likely viral and should resolve within 7 to 10 days  Follow-up with primary care provider if symptoms do not resolve within 1 to 2 weeks

## 2023-03-12 NOTE — PROGRESS NOTES
[facility]        NAME: Maximiliano Phillips is a 62 y o  female  : 1965    MRN: [id number]  DATE: 2023  TIME: 12:38 PM    Assessment and Plan   Viral syndrome [B34 9]  1  Viral syndrome  Covid/Flu-Office Collect      COVID/flu cultures pending, results should be available in MyChart within 24 hours  Please continue Tylenol/ibuprofen, over-the-counter decongestants and humidifier/hot shower steam as desired for symptom relief  Rest and ensure adequate hydration  Symptoms likely viral and should resolve within 7 to 10 days  Follow-up with primary care provider if symptoms do not resolve within 1 to 2 weeks  Patient Instructions   Viral Syndrome   WHAT YOU NEED TO KNOW:   Viral syndrome is a term used for symptoms of an infection caused by a virus  Viruses are spread easily from person to person on shared items  DISCHARGE INSTRUCTIONS:   Call your local emergency number (911 in the [address] [place] [address],[address]), or have someone call if:   • You have a seizure      • You cannot be woken      • You have chest pain or trouble breathing      Return to the emergency department if:   • You have a stiff neck, a bad headache, and sensitivity to light      • You feel weak, dizzy, or confused      • You stop urinating or urinate a lot less than usual      • You cough up blood or thick yellow or green mucus      • You have severe abdominal pain or your abdomen is larger than usual      Call your doctor if:   • Your symptoms do not get better with treatment or get worse after 3 days      • You have a rash or ear pain      • You have burning when you urinate      • You have questions or concerns about your condition or care      Medicines: You may  need any of the following:  • Acetaminophen  decreases pain and fever  It is available without a doctor's order  Ask how much to take and how often to take it  Follow directions   Read the labels of all other medicines you are using to see if they also contain acetaminophen, or ask your doctor or pharmacist  Acetaminophen can cause liver damage if not taken correctly      • NSAIDs , such as ibuprofen, help decrease swelling, pain, and fever  NSAIDs can cause stomach bleeding or kidney problems in certain people  If you take blood thinner medicine, always ask your healthcare provider if NSAIDs are safe for you  Always read the medicine label and follow directions      • Cold medicine  helps decrease swelling, control a cough, and relieve chest or nasal congestion       • Saline nasal spray  helps decrease nasal congestion       • Take your medicine as directed  Contact your healthcare provider if you think your medicine is not helping or if you have side effects  Tell your provider if you are allergic to any medicine  Keep a list of the medicines, vitamins, and herbs you take  Include the amounts, and when and why you take them  Bring the list or the pill bottles to follow-up visits  Carry your medicine list with you in case of an emergency      Manage your symptoms:   • Drink liquids as directed to prevent dehydration  Ask how much liquid to drink each day and which liquids are best for you  Do not drink liquids with caffeine  Caffeine can make dehydration worse       • Get plenty of rest to help your body heal   Take naps throughout the day  Ask your healthcare provider when you can return to work and your normal activities      • Use a cool mist humidifier  to increase air moisture in your home  This may make it easier for you to breathe and help decrease your cough       • Drink tea with honey or use cough drops for a sore throat  Cough drops are available without a doctor's order  Follow directions for taking cough drops      • Do not smoke or be close to anyone who is smoking  Nicotine and other chemicals in cigarettes and cigars can cause lung damage  Smoking can also delay healing  Ask your healthcare provider for information if you currently smoke and need help to quit  E-cigarettes or smokeless tobacco still contain nicotine  Talk to your healthcare provider before you use these products      Prevent the spread of germs:   • Wash your hands often throughout the day  Use soap and water  Rub your soapy hands together, lacing your fingers, for at least 20 seconds  Rinse with warm, running water  Dry your hands with a clean towel or paper towel  Use hand  that contains alcohol if soap and water are not available  Teach children how to wash their hands and use hand            • Cover sneezes and coughs  Turn your face away and cover your mouth and nose with a tissue  Throw the tissue away  Use the bend of your arm if a tissue is not available  Then wash your hands well with soap and water or use hand   Teach children how to cover a cough or sneeze      • Stay home while you are sick  Avoid crowds as much as possible      • Get the influenza (flu) vaccine as soon as recommended each year  The flu vaccine is available starting in September or October  Ask your healthcare provider about the pneumonia vaccine  This vaccine is usually recommended every 5 years in older adults         Follow up with your doctor as directed:  Write down your questions so you remember to ask them during your visits  © Copyright [employer] 2022 Information is for End User's use only and may not be sold, redistributed or otherwise used for commercial purposes  The above information is an  only  It is not intended as medical advice for individual conditions or treatments  Talk to your doctor, nurse or pharmacist before following any medical regimen to see if it is safe and effective for you  Follow up with PCP in 3-5 days  Proceed to  ER if symptoms worsen  Chief Complaint     Chief Complaint   Patient presents with   • Flu Symptoms     Started Friday with cold and flu ss    fatigue sorethroat body aches chills nausea and dry heaves         History of Present Illness       Patient is a 78-year-old female with past medical history significant for asthma, who presents for evaluation of flulike symptoms since Friday  She reports chills, cough, congestion, headaches and dry heaves which began this morning  She denies chest pain, palpitations, shortness of breath or wheezing, vomiting or diarrhea  Flu Symptoms  Associated symptoms include congestion, headaches, coughing and nausea  Pertinent negatives include no ear discharge, ear pain, eye discharge, eye itching, eye pain, eye redness, rhinorrhea, sore throat, stridor, fatigue, fever, chest pain, shortness of breath, wheezing, abdominal pain, constipation, diarrhea, vomiting, neck pain or rash  Review of Systems   Review of Systems   Constitutional: Positive for chills  Negative for fatigue and fever  HENT: Positive for congestion  Negative for ear discharge, ear pain, postnasal drip, rhinorrhea, sinus pressure, sinus pain, sneezing and sore throat  Eyes: Negative  Negative for pain, discharge, redness and itching  Respiratory: Positive for cough  Negative for apnea, choking, chest tightness, shortness of breath, wheezing and stridor  Cardiovascular: Negative  Negative for chest pain and palpitations  Gastrointestinal: Positive for nausea  Negative for abdominal distention, abdominal pain, anal bleeding, blood in stool, constipation, diarrhea, rectal pain and vomiting  Endocrine: Negative  Negative for polydipsia, polyphagia and polyuria  Genitourinary: Negative  Negative for decreased urine volume and flank pain  Musculoskeletal: Negative  Negative for arthralgias, back pain, myalgias, neck pain and neck stiffness  Skin: Negative  Negative for color change and rash  Allergic/Immunologic: Negative  Negative for environmental allergies  Neurological: Positive for headaches  Negative for dizziness, facial asymmetry, light-headedness and numbness  Hematological: Negative    Negative for adenopathy  Psychiatric/Behavioral: Negative            Current Medications       Current Outpatient Medications:   •  aspirin 81 mg chewable tablet, Chew, Disp: , Rfl:   •  Bioflavonoid Products (MARÍA-C PO), Take by mouth, Disp: , Rfl:   •  calcium carbonate (TUMS) 500 mg chewable tablet, Chew 1 tablet daily, Disp: , Rfl:   •  cetirizine (ZyrTEC) 10 mg tablet, Take by mouth, Disp: , Rfl:   •  Intrarosa 6 5 MG INST, INSERT 6 5 MG INTO THE VAGINA DAILY AT BEDTIME, Disp: 28 each, Rfl: 2  •  albuterol (PROVENTIL HFA,VENTOLIN HFA) 90 mcg/act inhaler, Inhale 2 puffs every 6 (six) hours as needed for wheezing (Patient not taking: Reported on 3/12/2023), Disp: 1 Inhaler, Rfl: 0  •  famotidine (PEPCID) 20 mg tablet, Take 1 tablet (20 mg total) by mouth 2 (two) times a day (Patient not taking: Reported on 3/12/2023), Disp: 180 tablet, Rfl: 1  •  triamcinolone (KENALOG) 0 1 % cream, Apply topically 2 (two) times a day (Patient not taking: Reported on 3/12/2023), Disp: 30 g, Rfl: 0    Current Allergies     Allergies as of 2023 - Reviewed 2023   Allergen Reaction Noted   • Carbamazepine Anaphylaxis 2013   • Elemental sulfur Anaphylaxis 2013   • Phenytoin Anaphylaxis 2013   • Succinylcholine Myalgia 2019            The following portions of the patient's history were reviewed and updated as appropriate: allergies, current medications, past family history, past medical history, past social history, past surgical history and problem list      Past Medical History:   Diagnosis Date   • Abnormal Pap smear of cervix    • Allergic rhinitis    • Anxiety and depression    • Asthma    • DDD (degenerative disc disease), lumbar    • Factor 5 Leiden mutation, heterozygous ([facility] [address] )    • Migraine    • Seizures ([facility] [address] )     last-       Past Surgical History:   Procedure Laterality Date   • ABDOMINOPLASTY     • AUGMENTATION BREAST     • CERVICAL CONE BIOPSY     •  SECTION     • ENDOMETRIAL ABLATION      age 40   • [de-identified] (HISTORICAL)  2010   • ROTATOR CUFF REPAIR     • TONSILLECTOMY     • TUBAL LIGATION         Family History   Problem Relation Age of Onset   • Cancer Mother    • Lung cancer Mother    • Heart disease Father    • Thyroid disease Father    • Stroke Father    • Hypertension Father    • Diabetes Father    • Rheum arthritis Father    • Thyroid disease Sister    • Hypertension Brother    • Cancer Maternal Grandmother    • Cancer Maternal Grandfather    • Cancer Paternal Grandmother    • Cancer Paternal Grandfather    • Bipolar disorder Son    • Post-traumatic stress disorder Son          Medications have been verified  Objective   Pulse 69   Temp 97 5 °F (36 4 °C)   Resp 18   Ht 5' 5" (1 651 m)   Wt 70 8 kg (156 lb)   LMP  (LMP Unknown)   SpO2 99%   BMI 25 96 kg/m²        Physical Exam     Physical Exam  Vitals and nursing note reviewed  Constitutional:       General: She is not in acute distress  Appearance: Normal appearance  She is not ill-appearing, toxic-appearing or diaphoretic  Interventions: She is not intubated  HENT:      Head: Normocephalic and atraumatic  Right Ear: Tympanic membrane, ear canal and external ear normal  There is no impacted cerumen  Left Ear: Tympanic membrane, ear canal and external ear normal  There is no impacted cerumen  Nose: Nose normal  No congestion or rhinorrhea  Mouth/Throat:      Mouth: Mucous membranes are moist       Pharynx: Oropharynx is clear  Uvula midline  No pharyngeal swelling, oropharyngeal exudate, posterior oropharyngeal erythema or uvula swelling  Tonsils: No tonsillar exudate or tonsillar abscesses  1+ on the right  1+ on the left  Eyes:      Extraocular Movements: Extraocular movements intact  Conjunctiva/sclera: Conjunctivae normal       Pupils: Pupils are equal, round, and reactive to light  Cardiovascular:      Rate and Rhythm: Normal rate and regular rhythm        Pulses: Normal pulses  Heart sounds: Normal heart sounds, S1 normal and S2 normal  Heart sounds not distant  No murmur heard  No friction rub  No gallop  Pulmonary:      Effort: Pulmonary effort is normal  No tachypnea, bradypnea, accessory muscle usage, prolonged expiration, respiratory distress or retractions  She is not intubated  Breath sounds: Normal breath sounds  No stridor, decreased air movement or transmitted upper airway sounds  No decreased breath sounds, wheezing, rhonchi or rales  Abdominal:      General: Bowel sounds are normal       Palpations: Abdomen is soft  Tenderness: There is no abdominal tenderness  There is no guarding or rebound  Musculoskeletal:         General: Normal range of motion  Cervical back: Full passive range of motion without pain, normal range of motion and neck supple  No rigidity or tenderness  No spinous process tenderness or muscular tenderness  Normal range of motion  Lymphadenopathy:      Cervical: No cervical adenopathy  Right cervical: No superficial cervical adenopathy  Left cervical: No superficial cervical adenopathy  Skin:     General: Skin is warm and dry  Capillary Refill: Capillary refill takes less than 2 seconds  Neurological:      General: No focal deficit present  Mental Status: She is alert and oriented to person, place, and time  Cranial Nerves: No cranial nerve deficit     Psychiatric:         Mood and Affect: Mood normal          Behavior: Behavior normal

## 2023-03-13 LAB
FLUAV RNA RESP QL NAA+PROBE: NEGATIVE
FLUBV RNA RESP QL NAA+PROBE: NEGATIVE
SARS-COV-2 RNA RESP QL NAA+PROBE: NEGATIVE

## 2023-03-14 ENCOUNTER — OFFICE VISIT (OUTPATIENT)
Dept: INTERNAL MEDICINE CLINIC | Facility: CLINIC | Age: 58
End: 2023-03-14

## 2023-03-14 VITALS
SYSTOLIC BLOOD PRESSURE: 152 MMHG | BODY MASS INDEX: 26.66 KG/M2 | OXYGEN SATURATION: 99 % | HEIGHT: 65 IN | WEIGHT: 160 LBS | HEART RATE: 84 BPM | DIASTOLIC BLOOD PRESSURE: 84 MMHG | TEMPERATURE: 98.3 F

## 2023-03-14 DIAGNOSIS — J06.9 UPPER RESPIRATORY TRACT INFECTION, UNSPECIFIED TYPE: Primary | ICD-10-CM

## 2023-03-14 RX ORDER — AZITHROMYCIN 250 MG/1
TABLET, FILM COATED ORAL
Qty: 6 TABLET | Refills: 0 | Status: SHIPPED | OUTPATIENT
Start: 2023-03-14 | End: 2023-03-19

## 2023-03-14 NOTE — PROGRESS NOTES
Assessment/Plan:    Diagnoses and all orders for this visit:    Upper respiratory tract infection, unspecified type  -     azithromycin (Zithromax) 250 mg tablet; Take 2 tablets (500 mg total) by mouth daily for 1 day, THEN 1 tablet (250 mg total) daily for 4 days  Discussed supportive management, call back if symptoms are not improved       There are no Patient Instructions on file for this visit  Subjective:      Patient ID: Richard Paniagua is a 62 y o  female    Complains of sore throat, congestion, runny nose along with chills for the last few days  Symptoms are worsening  No fever chills chest pain or shortness of breath or asthma exacerbation  Flu and COVID test were negative        Current Outpatient Medications:   •  albuterol (PROVENTIL HFA,VENTOLIN HFA) 90 mcg/act inhaler, Inhale 2 puffs every 6 (six) hours as needed for wheezing, Disp: 1 Inhaler, Rfl: 0  •  aspirin 81 mg chewable tablet, Chew, Disp: , Rfl:   •  azithromycin (Zithromax) 250 mg tablet, Take 2 tablets (500 mg total) by mouth daily for 1 day, THEN 1 tablet (250 mg total) daily for 4 days  , Disp: 6 tablet, Rfl: 0  •  Bioflavonoid Products (MARÍA-C PO), Take by mouth, Disp: , Rfl:   •  calcium carbonate (TUMS) 500 mg chewable tablet, Chew 1 tablet daily, Disp: , Rfl:   •  cetirizine (ZyrTEC) 10 mg tablet, Take by mouth, Disp: , Rfl:   •  Intrarosa 6 5 MG INST, INSERT 6 5 MG INTO THE VAGINA DAILY AT BEDTIME, Disp: 28 each, Rfl: 2  •  triamcinolone (KENALOG) 0 1 % cream, Apply topically 2 (two) times a day, Disp: 30 g, Rfl: 0  •  famotidine (PEPCID) 20 mg tablet, Take 1 tablet (20 mg total) by mouth 2 (two) times a day (Patient not taking: Reported on 3/12/2023), Disp: 180 tablet, Rfl: 1     Past Medical History:   Diagnosis Date   • Abnormal Pap smear of cervix    • Allergic rhinitis    • Anxiety and depression    • Asthma    • DDD (degenerative disc disease), lumbar    • Factor 5 Leiden mutation, heterozygous ([facility] [address] )    • Migraine    • Seizures ([facility] [address] )     last-         Past Surgical History:   Procedure Laterality Date   • ABDOMINOPLASTY     • AUGMENTATION BREAST     • CERVICAL CONE BIOPSY     •  SECTION     • ENDOMETRIAL ABLATION      age 40   • [de-identified] (HISTORICAL)     • ROTATOR CUFF REPAIR     • TONSILLECTOMY     • TUBAL LIGATION           Allergies   Allergen Reactions   • Carbamazepine Anaphylaxis   • Elemental Sulfur Anaphylaxis   • Phenytoin Anaphylaxis   • Succinylcholine Myalgia       Recent Results (from the past 1008 hour(s))   Covid/Flu-Office Collect    Collection Time: 23  1:56 PM    Specimen: Nose; Nares   Result Value Ref Range    SARS-CoV-2 Negative Negative    INFLUENZA A PCR Negative Negative    INFLUENZA B PCR Negative Negative       The following portions of the patient's history were reviewed and updated as appropriate: allergies, current medications, past family history, past medical history, past social history, past surgical history and problem list      Review of Systems   Constitutional: Negative for activity change, appetite change, chills, diaphoresis, fatigue, fever and unexpected weight change  HENT: Positive for congestion, rhinorrhea, sneezing and sore throat  Negative for drooling, ear discharge, ear pain, facial swelling, hearing loss, postnasal drip, sinus pressure, sinus pain, tinnitus, trouble swallowing and voice change  Eyes: Negative for discharge  Respiratory: Positive for cough  Negative for apnea, choking, chest tightness, shortness of breath, wheezing and stridor  Cardiovascular: Negative for chest pain, palpitations and leg swelling  Gastrointestinal: Negative for abdominal distention, abdominal pain, anal bleeding, blood in stool, constipation, diarrhea, nausea and vomiting  Genitourinary: Negative for decreased urine volume, difficulty urinating, frequency and urgency  Musculoskeletal: Negative for arthralgias, back pain and myalgias     Skin: Negative for color change  Neurological: Positive for headaches  Negative for dizziness, light-headedness and numbness  Objective:      Vitals:    03/14/23 1129   BP: 152/84   Pulse: 84   Temp: 98 3 °F (36 8 °C)   SpO2: 99%          Physical Exam  Vitals reviewed  Constitutional:       General: She is not in acute distress  Appearance: Normal appearance  She is not ill-appearing, toxic-appearing or diaphoretic  HENT:      Right Ear: Tympanic membrane normal       Left Ear: Tympanic membrane normal       Mouth/Throat:      Mouth: Mucous membranes are moist       Pharynx: Posterior oropharyngeal erythema present  No oropharyngeal exudate  Cardiovascular:      Rate and Rhythm: Normal rate and regular rhythm  Pulses: Normal pulses  Heart sounds: Normal heart sounds  No murmur heard  No friction rub  No gallop  Pulmonary:      Effort: Pulmonary effort is normal  No respiratory distress  Breath sounds: Normal breath sounds  No stridor  No wheezing, rhonchi or rales  Chest:      Chest wall: No tenderness  Musculoskeletal:      Right lower leg: No edema  Left lower leg: No edema  Skin:     General: Skin is warm and dry  Findings: No lesion or rash  Neurological:      Mental Status: She is alert

## 2023-05-08 ENCOUNTER — TELEPHONE (OUTPATIENT)
Dept: INTERNAL MEDICINE CLINIC | Facility: CLINIC | Age: 58
End: 2023-05-08

## 2023-05-08 DIAGNOSIS — R53.83 OTHER FATIGUE: Primary | ICD-10-CM

## 2023-05-08 NOTE — TELEPHONE ENCOUNTER
Patient called and said that she would like to be tested for Lymes Disease   She said she is having pain all over her body and she cant sleep, she ask for bloodwork to be put in her chart unless you want to see her first

## 2023-05-10 ENCOUNTER — APPOINTMENT (OUTPATIENT)
Dept: LAB | Facility: AMBULARY SURGERY CENTER | Age: 58
End: 2023-05-10

## 2023-05-10 ENCOUNTER — APPOINTMENT (OUTPATIENT)
Dept: LAB | Facility: CLINIC | Age: 58
End: 2023-05-10

## 2023-05-10 DIAGNOSIS — R53.83 OTHER FATIGUE: ICD-10-CM

## 2023-05-10 LAB — B BURGDOR IGG+IGM SER-ACNC: <0.2 AI

## 2023-06-12 DIAGNOSIS — N95.2 VAGINAL ATROPHY: ICD-10-CM

## 2023-06-12 DIAGNOSIS — N94.10 DYSPAREUNIA IN FEMALE: ICD-10-CM

## 2023-06-12 RX ORDER — PRASTERONE 6.5 MG/1
INSERT VAGINAL
Qty: 28 EACH | Refills: 2 | Status: SHIPPED | OUTPATIENT
Start: 2023-06-12

## 2023-06-28 ENCOUNTER — OFFICE VISIT (OUTPATIENT)
Dept: INTERNAL MEDICINE CLINIC | Facility: CLINIC | Age: 58
End: 2023-06-28
Payer: COMMERCIAL

## 2023-06-28 VITALS
TEMPERATURE: 99.6 F | DIASTOLIC BLOOD PRESSURE: 80 MMHG | HEART RATE: 74 BPM | SYSTOLIC BLOOD PRESSURE: 126 MMHG | WEIGHT: 164.4 LBS | BODY MASS INDEX: 27.39 KG/M2 | HEIGHT: 65 IN | OXYGEN SATURATION: 96 %

## 2023-06-28 DIAGNOSIS — R25.2 MUSCLE CRAMP: ICD-10-CM

## 2023-06-28 DIAGNOSIS — Z12.31 SCREENING MAMMOGRAM FOR BREAST CANCER: ICD-10-CM

## 2023-06-28 DIAGNOSIS — E78.2 MIXED HYPERLIPIDEMIA: ICD-10-CM

## 2023-06-28 DIAGNOSIS — R73.01 IMPAIRED FASTING BLOOD SUGAR: ICD-10-CM

## 2023-06-28 DIAGNOSIS — G50.0 TRIGEMINAL NEURALGIA: Primary | ICD-10-CM

## 2023-06-28 PROCEDURE — 99214 OFFICE O/P EST MOD 30 MIN: CPT | Performed by: INTERNAL MEDICINE

## 2023-06-28 NOTE — PROGRESS NOTES
Assessment/Plan:             1  Trigeminal neuralgia  Comments:  left  Orders:  -     CBC and differential; Future  -     MRI brain w wo contrast; Future; Expected date: 06/28/2023    2  Muscle cramp  -     CBC and differential; Future    3  Screening mammogram for breast cancer  -     Mammo screening bilateral w 3d & cad; Future; Expected date: 06/28/2023    4  Mixed hyperlipidemia  -     Comprehensive metabolic panel; Future  -     Lipid Panel with Direct LDL reflex; Future  -     TSH, 3rd generation; Future    5  Impaired fasting blood sugar  -     CBC and differential; Future  -     Hemoglobin A1C; Future           Subjective:      Patient ID: Olga Gutierrez is a 62 y o  female  Headache, sharp, middle of the night, does wake up from sleep, side of the face,      The following portions of the patient's history were reviewed and updated as appropriate: She  has a past medical history of Abnormal Pap smear of cervix, Allergic rhinitis, Anxiety and depression, Asthma, DDD (degenerative disc disease), lumbar, Factor 5 Leiden mutation, heterozygous ([facility] ), Migraine, and Seizures ([facility] )    She   Patient Active Problem List    Diagnosis Date Noted   • Muscle cramp 06/28/2023   • Trigeminal neuralgia 06/28/2023   • Eczema 03/08/2023   • Lower esophageal ring (Schatzki) 04/15/2021   • Osteopenia after menopause 04/15/2021   • Post-menopausal osteoporosis 04/15/2021   • Mixed hyperlipidemia 04/15/2021   • Other dysphagia 01/20/2021   • Gastroesophageal reflux disease 01/20/2021   • Mild intermittent asthma without complication 65/26/9590   • Migraine without aura and without status migrainosus, not intractable 10/02/2020   • Lumbar disc herniation with radiculopathy 10/02/2020   • Disorder of rotator cuff, right 10/02/2020   • Dysthymic disorder 10/02/2020   • Screening mammogram for breast cancer 10/02/2020   • Body mass index 26 0-26 9, adult 10/02/2020   • Fatigue 10/02/2020   • Asthma 11/09/2013   • Factor V deficiency ([facility] ) 2013   • Exudative senile macular degeneration of retina ([place] [address] ) 2013     She  has a past surgical history that includes  section; AUGMENTATION BREAST; Endometrial ablation; Tubal ligation; Abdominoplasty; Cervical cone biopsy (); Rotator cuff repair; Tonsillectomy; and Mammo (historical) ()  Her family history includes Bipolar disorder in her son; Cancer in her maternal grandfather, maternal grandmother, mother, paternal grandfather, and paternal grandmother; Diabetes in her father; Heart disease in her father; Hypertension in her brother and father; Lung cancer in her mother; Post-traumatic stress disorder in her son; Rheum arthritis in her father; Stroke in her father; Thyroid disease in her father and sister  She  reports that she quit smoking about 36 years ago  Her smoking use included cigarettes  She has never used smokeless tobacco  She reports that she does not drink alcohol and does not use drugs  Current Outpatient Medications   Medication Sig Dispense Refill   • albuterol (PROVENTIL HFA,VENTOLIN HFA) 90 mcg/act inhaler Inhale 2 puffs every 6 (six) hours as needed for wheezing 1 Inhaler 0   • aspirin 81 mg chewable tablet Chew     • Bioflavonoid Products (MARÍA-C PO) Take by mouth     • calcium carbonate (TUMS) 500 mg chewable tablet Chew 1 tablet daily     • cetirizine (ZyrTEC) 10 mg tablet Take by mouth     • Intrarosa 6 5 MG INST INSERT 6 5 MG INTO THE VAGINA DAILY AT BEDTIME 28 each 2   • triamcinolone (KENALOG) 0 1 % cream Apply topically 2 (two) times a day 30 g 0     No current facility-administered medications for this visit       Current Outpatient Medications on File Prior to Visit   Medication Sig   • albuterol (PROVENTIL HFA,VENTOLIN HFA) 90 mcg/act inhaler Inhale 2 puffs every 6 (six) hours as needed for wheezing   • aspirin 81 mg chewable tablet Chew   • Bioflavonoid Products (MARÍA-C PO) Take by mouth   • calcium carbonate (TUMS) 500 mg chewable "tablet Chew 1 tablet daily   • cetirizine (ZyrTEC) 10 mg tablet Take by mouth   • Intrarosa 6 5 MG INST INSERT 6 5 MG INTO THE VAGINA DAILY AT BEDTIME   • triamcinolone (KENALOG) 0 1 % cream Apply topically 2 (two) times a day   • [DISCONTINUED] famotidine (PEPCID) 20 mg tablet Take 1 tablet (20 mg total) by mouth 2 (two) times a day (Patient not taking: Reported on 3/12/2023)     No current facility-administered medications on file prior to visit  She is allergic to carbamazepine, elemental sulfur, phenytoin, and succinylcholine       Review of Systems   Constitutional: Negative for chills and fever  HENT: Negative for congestion, ear pain and sore throat  Eyes: Negative for pain  Respiratory: Negative for cough and shortness of breath  Cardiovascular: Negative for chest pain and leg swelling  Gastrointestinal: Negative for abdominal pain, nausea and vomiting  Endocrine: Negative for polyuria  Genitourinary: Negative for difficulty urinating, frequency and urgency  Musculoskeletal: Negative for arthralgias and back pain  Skin: Negative for rash  Neurological: Positive for headaches  Negative for weakness  Psychiatric/Behavioral: Negative for sleep disturbance  The patient is not nervous/anxious  Objective:      /80 (BP Location: Left arm, Patient Position: Sitting, Cuff Size: Standard)   Pulse 74   Temp 99 6 °F (37 6 °C) (Temporal)   Ht 5' 5\" (1 651 m)   Wt 74 6 kg (164 lb 6 4 oz)   LMP  (LMP Unknown)   SpO2 96%   BMI 27 36 kg/m²     Recent Results (from the past 1344 hour(s))   Lyme Total Antibody Profile with reflex to WB    Collection Time: 05/10/23  7:45 AM   Result Value Ref Range    Lyme Total Antibodies <0 2 0 2 - 8 0 AI        Physical Exam  Constitutional:       Appearance: Normal appearance  HENT:      Head: Normocephalic        Right Ear: Tympanic membrane, ear canal and external ear normal       Left Ear: Tympanic membrane, ear canal and external ear " normal       Nose: Nose normal  No congestion  Mouth/Throat:      Mouth: Mucous membranes are moist       Pharynx: Oropharynx is clear  No oropharyngeal exudate or posterior oropharyngeal erythema  Eyes:      Extraocular Movements: Extraocular movements intact  Conjunctiva/sclera: Conjunctivae normal       Pupils: Pupils are equal, round, and reactive to light  Cardiovascular:      Rate and Rhythm: Normal rate and regular rhythm  Heart sounds: Normal heart sounds  No murmur heard  Pulmonary:      Effort: Pulmonary effort is normal       Breath sounds: Normal breath sounds  No wheezing or rales  Abdominal:      General: Bowel sounds are normal  There is no distension  Palpations: Abdomen is soft  Tenderness: There is no abdominal tenderness  Musculoskeletal:         General: Normal range of motion  Cervical back: Normal range of motion and neck supple  Right lower leg: No edema  Left lower leg: No edema  Lymphadenopathy:      Cervical: No cervical adenopathy  Skin:     General: Skin is warm  Neurological:      General: No focal deficit present  Mental Status: She is alert and oriented to person, place, and time

## 2023-07-14 DIAGNOSIS — N94.10 DYSPAREUNIA IN FEMALE: ICD-10-CM

## 2023-07-14 DIAGNOSIS — N95.2 VAGINAL ATROPHY: ICD-10-CM

## 2023-07-14 RX ORDER — PRASTERONE 6.5 MG/1
INSERT VAGINAL
Qty: 28 EACH | Refills: 2 | Status: SHIPPED | OUTPATIENT
Start: 2023-07-14

## 2023-07-19 ENCOUNTER — TELEPHONE (OUTPATIENT)
Dept: OTHER | Facility: OTHER | Age: 58
End: 2023-07-19

## 2023-07-19 NOTE — TELEPHONE ENCOUNTER
Patient is calling regarding cancelling an appointment.     Date/Time: 7/19/23 @ 4:30pm    Patient was rescheduled: YES [] NO [x]    Patient requesting call back to reschedule: YES [] NO [x]

## 2023-09-04 ENCOUNTER — OFFICE VISIT (OUTPATIENT)
Dept: URGENT CARE | Age: 58
End: 2023-09-04
Payer: COMMERCIAL

## 2023-09-04 VITALS — HEART RATE: 95 BPM | RESPIRATION RATE: 20 BRPM | OXYGEN SATURATION: 97 % | TEMPERATURE: 97.3 F

## 2023-09-04 DIAGNOSIS — U07.1 COVID-19: Primary | ICD-10-CM

## 2023-09-04 PROCEDURE — 99213 OFFICE O/P EST LOW 20 MIN: CPT | Performed by: NURSE PRACTITIONER

## 2023-09-04 RX ORDER — DEXAMETHASONE 4 MG/1
4 TABLET ORAL 2 TIMES DAILY WITH MEALS
Qty: 10 TABLET | Refills: 0 | Status: SHIPPED | OUTPATIENT
Start: 2023-09-04

## 2023-09-04 RX ORDER — ALBUTEROL SULFATE 90 UG/1
2 AEROSOL, METERED RESPIRATORY (INHALATION) EVERY 6 HOURS PRN
Qty: 18 G | Refills: 0 | Status: SHIPPED | OUTPATIENT
Start: 2023-09-04

## 2023-09-04 NOTE — PROGRESS NOTES
[facility]        NAME: Ja Gomes is a 62 y.o. female  : 1965    MRN: [id number]  DATE: 2023  TIME: 9:36 AM    Assessment and Plan   COVID-19 [U07.1]  1. COVID-19  dexamethasone (DECADRON) 4 mg tablet    albuterol (Ventolin HFA) 90 mcg/act inhaler            Patient Instructions     Take medication as prescribed  Adequate hydration and rest  Low intensity exercise  Follow up with PCP in 3-5 days. Proceed to  ER if symptoms worsen. Chief Complaint     Chief Complaint   Patient presents with   • Cold Like Symptoms     Patient reported that she started feeling sick on Saturday. Patient reported started out feeling tired with a mild cough, and by the evening she felt awful. Patient reported that she has been incredibly tired, cough, chills, fever and bodyaches. Patient has a lot of back pain, and sore throat. Patient reported taking a COVID test this morning at 0500 which was positive. Patient reported she is taking Nightquil OTC for symptoms. Patient hasn't been eating either. History of Present Illness       HPI   Presents to clinic with complaint of COVID-19. States she started feeling symptoms about 2 days ago. Included loss of appetite, body aches, cough, chills and low-grade fever. Denies shortness of breath or other problems with breathing. Did a home COVID test and was positive. History of asthma    Review of Systems   Review of Systems   Constitutional: Positive for appetite change, chills, fatigue and fever (Low-grade). HENT: Negative for sore throat. Respiratory: Positive for cough (Mild). Negative for chest tightness, shortness of breath and wheezing. Cardiovascular: Negative for chest pain. Gastrointestinal: Negative for diarrhea and vomiting. Musculoskeletal: Positive for myalgias.          Current Medications       Current Outpatient Medications:   •  albuterol (Ventolin HFA) 90 mcg/act inhaler, Inhale 2 puffs every 6 (six) hours as needed for wheezing, Disp: 18 g, Rfl: 0  •  aspirin 81 mg chewable tablet, Chew, Disp: , Rfl:   •  Bioflavonoid Products (MARÍA-C PO), Take by mouth, Disp: , Rfl:   •  calcium carbonate (TUMS) 500 mg chewable tablet, Chew 1 tablet daily, Disp: , Rfl:   •  cetirizine (ZyrTEC) 10 mg tablet, Take by mouth, Disp: , Rfl:   •  dexamethasone (DECADRON) 4 mg tablet, Take 1 tablet (4 mg total) by mouth 2 (two) times a day with meals, Disp: 10 tablet, Rfl: 0  •  Intrarosa 6.5 MG INST, INSERT 6.5 MG INTO THE VAGINA DAILY AT BEDTIME, Disp: 28 each, Rfl: 2  •  triamcinolone (KENALOG) 0.1 % cream, Apply topically 2 (two) times a day, Disp: 30 g, Rfl: 0  •  albuterol (PROVENTIL HFA,VENTOLIN HFA) 90 mcg/act inhaler, Inhale 2 puffs every 6 (six) hours as needed for wheezing, Disp: 1 Inhaler, Rfl: 0    Current Allergies     Allergies as of 2023 - Reviewed 2023   Allergen Reaction Noted   • Carbamazepine Anaphylaxis 2013   • Elemental sulfur Anaphylaxis 2013   • Phenytoin Anaphylaxis 2013   • Succinylcholine Myalgia 2019            The following portions of the patient's history were reviewed and updated as appropriate: allergies, current medications, past family history, past medical history, past social history, past surgical history and problem list.     Past Medical History:   Diagnosis Date   • Abnormal Pap smear of cervix    • Allergic rhinitis    • Anxiety and depression    • Asthma    • DDD (degenerative disc disease), lumbar    • Factor 5 Leiden mutation, heterozygous ([address])    • Migraine    • Seizures ([address])     last-       Past Surgical History:   Procedure Laterality Date   • ABDOMINOPLASTY     • AUGMENTATION BREAST     • CERVICAL CONE BIOPSY     •  SECTION     • ENDOMETRIAL ABLATION      age 40   • MAMMO (HISTORICAL)     • ROTATOR CUFF REPAIR     • TONSILLECTOMY     • TUBAL LIGATION         Family History   Problem Relation Age of Onset   • Cancer Mother    • Lung cancer Mother    • Heart disease Father    • Thyroid disease Father    • Stroke Father    • Hypertension Father    • Diabetes Father    • Rheum arthritis Father    • Thyroid disease Sister    • Hypertension Brother    • Cancer Maternal Grandmother    • Cancer Maternal Grandfather    • Cancer Paternal Grandmother    • Cancer Paternal Grandfather    • Bipolar disorder Son    • Post-traumatic stress disorder Son          Medications have been verified. Objective   Pulse 95   Temp (!) 97.3 °F (36.3 °C) (Temporal)   Resp 20   LMP  (LMP Unknown)   SpO2 97%   No LMP recorded (lmp unknown). Patient is postmenopausal.       Physical Exam     Physical Exam  Constitutional:       General: She is not in acute distress. Appearance: She is not ill-appearing or diaphoretic. HENT:      Right Ear: Tympanic membrane normal.      Left Ear: Tympanic membrane normal.      Nose: Rhinorrhea present. Cardiovascular:      Rate and Rhythm: Regular rhythm. Heart sounds: Normal heart sounds. Pulmonary:      Effort: Pulmonary effort is normal.      Breath sounds: Wheezing (Mild, right-sided) present. Musculoskeletal:      Cervical back: No rigidity. Lymphadenopathy:      Cervical: No cervical adenopathy. Neurological:      Mental Status: She is alert.

## 2023-09-04 NOTE — LETTER
September 4, 2023     Patient: Zee Wilson   YOB: 1965   Date of Visit: 9/4/2023       To Whom it May Concern:    Dorothy Wilson was seen in my clinic on 9/4/2023. She may return to work on 09/07/2023. Upon her return, she should use a mask daily until 09/11/2023. If you have any questions or concerns, please don't hesitate to call.          Sincerely,          RHETT Ayala        CC: No Recipients

## 2023-09-21 ENCOUNTER — OFFICE VISIT (OUTPATIENT)
Dept: INTERNAL MEDICINE CLINIC | Facility: CLINIC | Age: 58
End: 2023-09-21
Payer: COMMERCIAL

## 2023-09-21 VITALS
WEIGHT: 159 LBS | TEMPERATURE: 98.7 F | HEIGHT: 65 IN | SYSTOLIC BLOOD PRESSURE: 120 MMHG | OXYGEN SATURATION: 99 % | HEART RATE: 97 BPM | BODY MASS INDEX: 26.49 KG/M2 | DIASTOLIC BLOOD PRESSURE: 80 MMHG

## 2023-09-21 DIAGNOSIS — M25.512 ACUTE PAIN OF LEFT SHOULDER: ICD-10-CM

## 2023-09-21 DIAGNOSIS — Z12.31 ENCOUNTER FOR SCREENING MAMMOGRAM FOR MALIGNANT NEOPLASM OF BREAST: ICD-10-CM

## 2023-09-21 DIAGNOSIS — M81.0 POSTMENOPAUSAL OSTEOPOROSIS: ICD-10-CM

## 2023-09-21 DIAGNOSIS — M75.82 TENDINITIS OF LEFT ROTATOR CUFF: ICD-10-CM

## 2023-09-21 DIAGNOSIS — M12.812 ROTATOR CUFF ARTHROPATHY OF LEFT SHOULDER: ICD-10-CM

## 2023-09-21 DIAGNOSIS — J01.00 ACUTE MAXILLARY SINUSITIS, RECURRENCE NOT SPECIFIED: Primary | ICD-10-CM

## 2023-09-21 PROCEDURE — 99214 OFFICE O/P EST MOD 30 MIN: CPT | Performed by: INTERNAL MEDICINE

## 2023-09-21 RX ORDER — AZITHROMYCIN 250 MG/1
TABLET, FILM COATED ORAL
Qty: 6 TABLET | Refills: 0 | Status: SHIPPED | OUTPATIENT
Start: 2023-09-21 | End: 2023-09-26

## 2023-09-21 NOTE — PROGRESS NOTES
Assessment/Plan:             1. Acute maxillary sinusitis, recurrence not specified  -     azithromycin (Zithromax) 250 mg tablet; Take 2 tablets (500 mg total) by mouth daily for 1 day, THEN 1 tablet (250 mg total) daily for 4 days. 2. Acute pain of left shoulder  -     XR shoulder 2+ vw left; Future; Expected date: 09/21/2023    3. Encounter for screening mammogram for malignant neoplasm of breast  -     Mammo screening bilateral w 3d & cad; Future; Expected date: 09/21/2023    4. Postmenopausal osteoporosis  -     DXA bone density spine hip and pelvis; Future; Expected date: 09/21/2023    5. Tendinitis of left rotator cuff  -     XR shoulder 2+ vw left; Future; Expected date: 09/21/2023  -     Ambulatory referral to Physical Therapy; Future    6. Rotator cuff arthropathy of left shoulder  -     Ambulatory referral to Physical Therapy; Future  -     MRI shoulder left wo contrast; Future; Expected date: 09/21/2023           Subjective:      Patient ID: Cindy Preciado is a 62 y.o. female. Cough, most of the time dry, feels secretions in the back of the throat, also left shoulder pain off-and-on, for a while, no trauma,    Fatigue  Associated symptoms include arthralgias, coughing and fatigue. Pertinent negatives include no abdominal pain, chest pain, chills, congestion, fever, headaches, nausea, rash, sore throat, vomiting or weakness. Cough  Pertinent negatives include no chest pain, chills, ear pain, fever, headaches, rash, sore throat or shortness of breath. The following portions of the patient's history were reviewed and updated as appropriate: She  has a past medical history of Abnormal Pap smear of cervix, Allergic rhinitis, Anxiety and depression, Asthma, DDD (degenerative disc disease), lumbar, Factor 5 Leiden mutation, heterozygous ([address]), Migraine, and Seizures ([address]).   She   Patient Active Problem List    Diagnosis Date Noted   • Tendinitis of left rotator cuff 09/21/2023   • Acute pain of left shoulder 2023   • Muscle cramp 2023   • Trigeminal neuralgia 2023   • Eczema 2023   • Lower esophageal ring ([place]atz[place]) 04/15/2021   • Osteopenia after menopause 04/15/2021   • Post-menopausal osteoporosis 04/15/2021   • Mixed hyperlipidemia 04/15/2021   • Other dysphagia 2021   • Gastroesophageal reflux disease 2021   • Mild intermittent asthma without complication    • Migraine without aura and without status migrainosus, not intractable 10/02/2020   • Lumbar disc herniation with radiculopathy 10/02/2020   • Disorder of rotator cuff, right 10/02/2020   • Dysthymic disorder 10/02/2020   • Screening mammogram for breast cancer 10/02/2020   • Body mass index 26.0-26.9, adult 10/02/2020   • Fatigue 10/02/2020   • Asthma 2013   • Factor V deficiency ([address]) 2013   • Exudative senile macular degeneration of retina ([address]) 2013     She  has a past surgical history that includes  section; AUGMENTATION BREAST; Endometrial ablation; Tubal ligation; Abdominoplasty; Cervical cone biopsy (); Rotator cuff repair; Tonsillectomy; and Mammo (historical) (). Her family history includes Bipolar disorder in her son; Cancer in her maternal grandfather, maternal grandmother, mother, paternal grandfather, and paternal grandmother; Diabetes in her father; Heart disease in her father; Hypertension in her brother and father; Lung cancer in her mother; Post-traumatic stress disorder in her son; Rheum arthritis in her father; Stroke in her father; Thyroid disease in her father and sister. She  reports that she quit smoking about 36 years ago. Her smoking use included cigarettes. She has never used smokeless tobacco. She reports that she does not drink alcohol and does not use drugs.   Current Outpatient Medications   Medication Sig Dispense Refill   • albuterol (Ventolin HFA) 90 mcg/act inhaler Inhale 2 puffs every 6 (six) hours as needed for wheezing 18 g 0   • aspirin 81 mg chewable tablet Chew     • azithromycin (Zithromax) 250 mg tablet Take 2 tablets (500 mg total) by mouth daily for 1 day, THEN 1 tablet (250 mg total) daily for 4 days. 6 tablet 0   • Bioflavonoid Products (MARÍA-C PO) Take by mouth     • calcium carbonate (TUMS) 500 mg chewable tablet Chew 1 tablet daily     • cetirizine (ZyrTEC) 10 mg tablet Take by mouth     • triamcinolone (KENALOG) 0.1 % cream Apply topically 2 (two) times a day 30 g 0   • Intrarosa 6.5 MG INST INSERT 6.5 MG INTO THE VAGINA DAILY AT BEDTIME (Patient not taking: Reported on 9/21/2023) 28 each 2     No current facility-administered medications for this visit. Current Outpatient Medications on File Prior to Visit   Medication Sig   • albuterol (Ventolin HFA) 90 mcg/act inhaler Inhale 2 puffs every 6 (six) hours as needed for wheezing   • aspirin 81 mg chewable tablet Chew   • Bioflavonoid Products (MARÍA-C PO) Take by mouth   • calcium carbonate (TUMS) 500 mg chewable tablet Chew 1 tablet daily   • cetirizine (ZyrTEC) 10 mg tablet Take by mouth   • triamcinolone (KENALOG) 0.1 % cream Apply topically 2 (two) times a day   • Intrarosa 6.5 MG INST INSERT 6.5 MG INTO THE VAGINA DAILY AT BEDTIME (Patient not taking: Reported on 9/21/2023)   • [DISCONTINUED] albuterol (PROVENTIL HFA,VENTOLIN HFA) 90 mcg/act inhaler Inhale 2 puffs every 6 (six) hours as needed for wheezing   • [DISCONTINUED] dexamethasone (DECADRON) 4 mg tablet Take 1 tablet (4 mg total) by mouth 2 (two) times a day with meals     No current facility-administered medications on file prior to visit. She is allergic to carbamazepine, elemental sulfur, phenytoin, and succinylcholine. .    Review of Systems   Constitutional: Positive for fatigue. Negative for chills and fever. HENT: Negative for congestion, ear pain and sore throat. Eyes: Negative for pain. Respiratory: Positive for cough. Negative for shortness of breath.     Cardiovascular: Negative for chest pain and leg swelling. Gastrointestinal: Negative for abdominal pain, nausea and vomiting. Endocrine: Negative for polyuria. Genitourinary: Negative for difficulty urinating, frequency and urgency. Musculoskeletal: Positive for arthralgias. Negative for back pain. Skin: Negative for rash. Neurological: Negative for weakness and headaches. Psychiatric/Behavioral: Negative for sleep disturbance. The patient is not nervous/anxious. Objective:      /80 (BP Location: Left arm, Patient Position: Sitting, Cuff Size: Standard)   Pulse 97   Temp 98.7 °F (37.1 °C) (Temporal)   Ht 5' 5" (1.651 m)   Wt 72.1 kg (159 lb)   LMP  (LMP Unknown)   SpO2 99%   BMI 26.46 kg/m²     No results found for this or any previous visit (from the past 1344 hour(s)). Physical Exam  Constitutional:       Appearance: Normal appearance. HENT:      Head: Normocephalic. Right Ear: Tympanic membrane, ear canal and external ear normal.      Left Ear: Tympanic membrane, ear canal and external ear normal.      Nose: Nose normal. No congestion. Mouth/Throat:      Mouth: Mucous membranes are moist.      Pharynx: Oropharynx is clear. No oropharyngeal exudate or posterior oropharyngeal erythema. Eyes:      Extraocular Movements: Extraocular movements intact. Conjunctiva/sclera: Conjunctivae normal.   Cardiovascular:      Rate and Rhythm: Normal rate and regular rhythm. Heart sounds: Normal heart sounds. No murmur heard. Pulmonary:      Effort: Pulmonary effort is normal.      Breath sounds: Normal breath sounds. No wheezing or rales. Abdominal:      General: Bowel sounds are normal. There is no distension. Palpations: Abdomen is soft. Tenderness: There is no abdominal tenderness. Musculoskeletal:      Cervical back: Normal range of motion and neck supple. Right lower leg: No edema. Left lower leg: No edema.       Comments: Left shoulder examination-mild tenderness present laterally, movement painful and restricted due to pain   Lymphadenopathy:      Cervical: No cervical adenopathy. Skin:     General: Skin is warm. Neurological:      General: No focal deficit present. Mental Status: She is alert and oriented to person, place, and time.

## 2023-10-28 ENCOUNTER — HOSPITAL ENCOUNTER (OUTPATIENT)
Dept: MRI IMAGING | Facility: HOSPITAL | Age: 58
Discharge: HOME | End: 2023-10-28
Attending: INTERNAL MEDICINE
Payer: COMMERCIAL

## 2023-10-28 DIAGNOSIS — M12.812 ROTATOR CUFF ARTHROPATHY OF LEFT SHOULDER: ICD-10-CM

## 2023-10-28 PROCEDURE — G1004 CDSM NDSC: HCPCS

## 2023-10-28 PROCEDURE — 73221 MRI JOINT UPR EXTREM W/O DYE: CPT

## 2023-11-01 ENCOUNTER — ANNUAL EXAM (OUTPATIENT)
Dept: OBGYN CLINIC | Facility: CLINIC | Age: 58
End: 2023-11-01
Payer: COMMERCIAL

## 2023-11-01 VITALS
BODY MASS INDEX: 26.86 KG/M2 | WEIGHT: 161.2 LBS | SYSTOLIC BLOOD PRESSURE: 126 MMHG | DIASTOLIC BLOOD PRESSURE: 80 MMHG | HEIGHT: 65 IN

## 2023-11-01 DIAGNOSIS — Z12.31 ENCOUNTER FOR SCREENING MAMMOGRAM FOR MALIGNANT NEOPLASM OF BREAST: ICD-10-CM

## 2023-11-01 DIAGNOSIS — Z01.419 ENCOUNTER FOR ANNUAL ROUTINE GYNECOLOGICAL EXAMINATION: Primary | ICD-10-CM

## 2023-11-01 DIAGNOSIS — N95.2 VAGINAL ATROPHY: ICD-10-CM

## 2023-11-01 DIAGNOSIS — N94.10 DYSPAREUNIA IN FEMALE: ICD-10-CM

## 2023-11-01 PROCEDURE — 99396 PREV VISIT EST AGE 40-64: CPT | Performed by: OBSTETRICS & GYNECOLOGY

## 2023-11-01 RX ORDER — PRASTERONE 6.5 MG/1
6.5 INSERT VAGINAL DAILY
Qty: 28 EACH | Refills: 11 | Status: SHIPPED | OUTPATIENT
Start: 2023-11-01

## 2023-11-01 NOTE — PROGRESS NOTES
Assessment/Plan:  Pap deferred due to low risk status. Encourage self breast examination as well as calcium supplementation. Continue annual mammogram.  Reviewed colon cancer screening, up-to-date. She will continue vaginal moisturizer and Intrarosa as scheduled. Return to office in 1 year or as needed  No problem-specific Assessment & Plan notes found for this encounter. Diagnoses and all orders for this visit:    Encounter for annual routine gynecological examination    Encounter for screening mammogram for malignant neoplasm of breast  -     Mammo screening bilateral w 3d & cad; Future    Vaginal atrophy  -     Prasterone (Intrarosa) 6.5 MG INST; Insert 6.5 mg into the vagina in the morning    Dyspareunia in female  -     Prasterone (Intrarosa) 6.5 MG INST; Insert 6.5 mg into the vagina in the morning    Other orders  -     Multiple Vitamins-Minerals (PRESERVISION AREDS 2 PO); Take by mouth          Subjective:      Patient ID: Ciara Curtis is a 62 y.o. female. HPI    This is a pleasant 31-year-old female P2 ( x1,  x1 with tubal ligation) presents for GYN exam.  Patient went through menopause at age 50. She has never been on hormone replacement therapy. She denies any vaginal bleeding or spotting. No changes in bowel or bladder function. She has been in a monogamous relationship for over 41 years. GYN history significant for cone biopsy at age 27. Pap smears have been normal since then. She has been using Intrarosa as well as a vaginal moisturizer for vaginal atrophy and is noticed marked improvement. Colonoscopy  normal with follow-up in 10 years    She does follow-up with her family physician on a regular basis.     2022 Pap wnl    The following portions of the patient's history were reviewed and updated as appropriate: allergies, current medications, past family history, past medical history, past social history, past surgical history, and problem list.    Review of Systems   Constitutional:  Negative for fatigue, fever and unexpected weight change. Respiratory:  Negative for cough, chest tightness, shortness of breath and wheezing. Cardiovascular: Negative. Negative for chest pain and palpitations. Gastrointestinal: Negative. Negative for abdominal distention, abdominal pain, blood in stool, constipation, diarrhea, nausea and vomiting. Genitourinary: Negative. Negative for difficulty urinating, dyspareunia, dysuria, flank pain, frequency, genital sores, hematuria, pelvic pain, urgency, vaginal bleeding, vaginal discharge and vaginal pain. Skin:  Negative for rash. Objective:      /80   Ht 5' 5" (1.651 m)   Wt 73.1 kg (161 lb 3.2 oz)   LMP  (LMP Unknown)   BMI 26.83 kg/m²          Physical Exam  Constitutional:       Appearance: Normal appearance. She is well-developed. HENT:      Head: Normocephalic and atraumatic. Cardiovascular:      Rate and Rhythm: Normal rate and regular rhythm. Pulmonary:      Effort: Pulmonary effort is normal.      Breath sounds: Normal breath sounds. Chest:   Breasts:     Right: No inverted nipple, mass, nipple discharge, skin change or tenderness. Left: No inverted nipple, mass, nipple discharge, skin change or tenderness. Abdominal:      General: Bowel sounds are normal. There is no distension. Palpations: Abdomen is soft. Tenderness: There is no abdominal tenderness. There is no guarding or rebound. Genitourinary:     Labia:         Right: No rash, tenderness or lesion. Left: No rash, tenderness or lesion. Vagina: Normal. No signs of injury. No vaginal discharge or tenderness. Cervix: No cervical motion tenderness, discharge, friability, lesion or cervical bleeding. Uterus: Not enlarged and not tender. Adnexa:         Right: No mass, tenderness or fullness. Left: No mass, tenderness or fullness. Neurological:      Mental Status: She is alert. Psychiatric:         Behavior: Behavior normal.         The vagina is evident of slight estrogen deficiency. Cervix is slightly distorted due to prior surgery. No pelvic floor prolapse.

## 2023-11-09 ENCOUNTER — TELEPHONE (OUTPATIENT)
Dept: INTERNAL MEDICINE CLINIC | Facility: CLINIC | Age: 58
End: 2023-11-09

## 2023-11-09 DIAGNOSIS — M12.812 ROTATOR CUFF ARTHROPATHY OF LEFT SHOULDER: Primary | ICD-10-CM

## 2023-11-27 ENCOUNTER — OFFICE VISIT (OUTPATIENT)
Dept: OBGYN CLINIC | Facility: OTHER | Age: 58
End: 2023-11-27
Payer: COMMERCIAL

## 2023-11-27 ENCOUNTER — APPOINTMENT (OUTPATIENT)
Dept: RADIOLOGY | Facility: OTHER | Age: 58
End: 2023-11-27
Payer: COMMERCIAL

## 2023-11-27 VITALS
HEIGHT: 65 IN | BODY MASS INDEX: 27.19 KG/M2 | SYSTOLIC BLOOD PRESSURE: 143 MMHG | DIASTOLIC BLOOD PRESSURE: 86 MMHG | WEIGHT: 163.2 LBS | HEART RATE: 71 BPM

## 2023-11-27 DIAGNOSIS — M75.02 ADHESIVE CAPSULITIS OF LEFT SHOULDER: Primary | ICD-10-CM

## 2023-11-27 DIAGNOSIS — M12.812 ROTATOR CUFF ARTHROPATHY OF LEFT SHOULDER: ICD-10-CM

## 2023-11-27 DIAGNOSIS — M25.512 LEFT SHOULDER PAIN, UNSPECIFIED CHRONICITY: ICD-10-CM

## 2023-11-27 PROCEDURE — 99204 OFFICE O/P NEW MOD 45 MIN: CPT | Performed by: ORTHOPAEDIC SURGERY

## 2023-11-27 PROCEDURE — 20610 DRAIN/INJ JOINT/BURSA W/O US: CPT | Performed by: ORTHOPAEDIC SURGERY

## 2023-11-27 PROCEDURE — 73030 X-RAY EXAM OF SHOULDER: CPT

## 2023-11-27 RX ORDER — IBUPROFEN 800 MG/1
200 TABLET ORAL EVERY 6 HOURS PRN
COMMUNITY

## 2023-11-27 RX ORDER — BETAMETHASONE SODIUM PHOSPHATE AND BETAMETHASONE ACETATE 3; 3 MG/ML; MG/ML
6 INJECTION, SUSPENSION INTRA-ARTICULAR; INTRALESIONAL; INTRAMUSCULAR; SOFT TISSUE
Status: COMPLETED | OUTPATIENT
Start: 2023-11-27 | End: 2023-11-27

## 2023-11-27 RX ORDER — BUPIVACAINE HYDROCHLORIDE 2.5 MG/ML
2 INJECTION, SOLUTION INFILTRATION; PERINEURAL
Status: COMPLETED | OUTPATIENT
Start: 2023-11-27 | End: 2023-11-27

## 2023-11-27 RX ADMIN — BETAMETHASONE SODIUM PHOSPHATE AND BETAMETHASONE ACETATE 6 MG: 3; 3 INJECTION, SUSPENSION INTRA-ARTICULAR; INTRALESIONAL; INTRAMUSCULAR; SOFT TISSUE at 08:15

## 2023-11-27 RX ADMIN — BUPIVACAINE HYDROCHLORIDE 2 ML: 2.5 INJECTION, SOLUTION INFILTRATION; PERINEURAL at 08:15

## 2023-11-27 NOTE — PATIENT INSTRUCTIONS
CORTICOSTEROID INJECTION  What is a corticosteroid? Injuries or disease such as arthritis, bursitis or tendonitis result in inflammation. In turn, this inflammation can cause swelling and pain. A local injection of a corticosteroid is provided to diminish inflammation. By doing so, it will also decrease pain and swelling which is making you uncomfortable. Is this the same thing as a Cortisone Injection? Cortisone® is a brand name of a corticosteroid used commonly in the past.  Today I commonly use a more water-soluble corticosteroid named Celestone®. Will the injection hurt? As with any injection, you may feel pain at the time of the injection. Typically, I use a local anesthetic (Acey Harmony) in addition to the corticosteroid to determine if the injection has been placed in the appropriate location. Hence it is important to monitor your symptoms 4-6 hours after the injection, as the area will be anesthetized (numb) while the local anesthetic is working. Once the local anesthetic wears off, the intensity of pain can be the same as it was prior to the injection, or even worse. This does not mean that the injection is not working. The corticosteroid may take 24-72 hours to begin having a positive effect. If you do experience an increase in pain, the use of an ice pack on the area for 20 minutes at a time should help. It is also helpful to take an oral anti-inflammatory such as Tylenol® or Motrin® if you are able to medically do so. For this reason it is best to avoid activities that put stress on the area the first 24 hours after the injection. How long will pain relief last?  This will vary according to the type and severity of the symptoms being treated and the severity of the condition. Symptom relief may last weeks to months. I typically couple injections with physical therapy so that the underlying problem causing the inflammation may be treated as the pain diminishes.   If the combination is not successful, you may be a surgical candidate. I have read bad things about steroids. Will these things happen to me? Corticosteroids, when utilized properly, are safe and effective drugs. When used in a low dose, potential adverse reactions are very rare. Some patients may experience a sensation of flushing for several days. Very rarely, there can be a local reaction which may include increased discomfort for a period of time in the areas that has been injected. A steroid should not be used over and over again. Multiple injections in the same area can produce adverse effects such as tissue atrophy and degeneration of tendon or cartilage. A small percentage of patients (< 0.1%) may develop an infection in the joint after injection. This is a treatable problem, but if neglected, may result in permanent disability. Signs of infection include redness, swelling, discharge, fevers, increasing pain and drainage from the injection site. This represents an emergency and you should contact our office immediately or seek treatment in the ER if after hours. If I have diabetes, will this injection affect me? If you are diabetic, an injection of a corticosteroid can raise your blood sugar level, requiring more insulin for a brief period of time. This may necessitate careful blood sugar maintenance. If the elevated sugars are not able to be controlled, contact your diabetic doctor for guidance.

## 2023-11-27 NOTE — PROGRESS NOTES
Assessment  Diagnoses and all orders for this visit:    Adhesive capsulitis of left shoulder      Discussion and Plan:    Explained to the patient that her examination and symptoms are consistent with adhesive capsulitis of the left shoulder. Her MRI also has suggestion of this diagnosis without rotator cuff tear. The pathoanatomy and natural history of this diagnosis was explained to the patient in detail today in the office. She understood and all questions were answered. She was provided with a cortisone injection today in the office. This is documented appropriately below  She will also begin formal PT/HEP for stretching exercises only  Follow up in 3 months with Jasmin Kenyon for re evaluation of ROM     Subjective:   Patient ID: Raheel Ruvalcaba is a 62 y.o. female    The patient presents today for an orthopedic surgery consultation visit at the request of Dr Darrian Gamboa on 11/9/23 with a chief complaint of left shoulder pain. The pain began a few month(s) ago and is not associated with an acute injury. The patient describes the pain as aching, dull, and sharp in intensity,  intermittent, occurring with increasing frequency in timing, and localizes the pain to the  left subacromial joint, glenohumeral joint, deltoid. The pain is worse with movement, overhead work, and raising arm over head and relieved by rest, ice, avoiding the painful activities. The pain is not associated with numbness and tingling. The pain is not associated with constitutional symptoms. The patient is awoken at night by the pain. The patient has had no prior treatment.         The following portions of the patient's history were reviewed and updated as appropriate: allergies, current medications, past family history, past medical history, past social history, past surgical history and problem list.    Objective:  /86 (BP Location: Right arm, Patient Position: Sitting, Cuff Size: Standard)   Pulse 71   Ht 5' 5" (1.651 m)   Wt 74 kg (163 lb 3.2 oz)   LMP  (LMP Unknown)   BMI 27.16 kg/m²       Left Shoulder Exam     Range of Motion   External rotation:  40   Forward flexion:  140   Internal rotation 0 degrees:  Sacrum     Other   Erythema: absent  Sensation: normal  Pulse: present             Physical Exam  Constitutional:       Appearance: She is well-developed. Eyes:      Pupils: Pupils are equal, round, and reactive to light. Pulmonary:      Effort: Pulmonary effort is normal.      Breath sounds: Normal breath sounds. Skin:     General: Skin is warm and dry. Neurological:      Mental Status: She is alert and oriented to person, place, and time. Psychiatric:         Behavior: Behavior normal.         Thought Content: Thought content normal.         Judgment: Judgment normal.       Large joint arthrocentesis: L glenohumeral  Universal Protocol:  Consent: Verbal consent obtained. Risks and benefits: risks, benefits and alternatives were discussed  Consent given by: patient  Time out: Immediately prior to procedure a "time out" was called to verify the correct patient, procedure, equipment, support staff and site/side marked as required. Site marked: the operative site was marked  Radiology Images displayed and confirmed.  If images not available, report reviewed: imaging studies available  Patient identity confirmed: verbally with patient  Supporting Documentation  Indications: pain and diagnostic evaluation   Procedure Details  Location: shoulder - L glenohumeral  Preparation: Patient was prepped and draped in the usual sterile fashion  Needle size: 22 G  Ultrasound guidance: no  Approach: posterior  Medications administered: 2 mL bupivacaine 0.25 %; 6 mg betamethasone acetate-betamethasone sodium phosphate 6 (3-3) mg/mL    Patient tolerance: patient tolerated the procedure well with no immediate complications  Dressing:  Sterile dressing applied        I have personally reviewed pertinent films in PACS and my interpretation is as follows. X Ray Left Shoulder 11/27/23: No acute osseous abnormalities or degenerative changes. MRI Left Shoulder 10/28/23: Mild rotator cuff tendonitis. No full thickness tear.  Thickening of the inferior glenohumeral ligament which can be seen in the setting of adhesive capsulitis    Scribe Attestation      I,:  Armin Mantilla am acting as a scribe while in the presence of the attending physician.:       I,:  Karson Lam MD personally performed the services described in this documentation    as scribed in my presence.:

## 2023-12-06 ENCOUNTER — EVALUATION (OUTPATIENT)
Dept: PHYSICAL THERAPY | Age: 58
End: 2023-12-06
Payer: COMMERCIAL

## 2023-12-06 DIAGNOSIS — M75.02 ADHESIVE CAPSULITIS OF LEFT SHOULDER: Primary | ICD-10-CM

## 2023-12-06 PROCEDURE — 97110 THERAPEUTIC EXERCISES: CPT

## 2023-12-06 PROCEDURE — 97161 PT EVAL LOW COMPLEX 20 MIN: CPT

## 2023-12-06 NOTE — PROGRESS NOTES
PT Evaluation     Today's date: 2023  Patient name: Rios Ivy  : 1965  MRN: [id number]  Referring provider: Alena Kramer  Dx:   Encounter Diagnosis     ICD-10-CM    1. Adhesive capsulitis of left shoulder  M75.02 Ambulatory Referral to Physical Therapy          Start Time: 1700  Stop Time: 1740  Total time in clinic (min): 40 minutes    Assessment  Assessment details: Patient is a 62year old female presenting to therapy for evaluation with c/c of L UE pain, onset a few months ago with no STEVEN. Evaluation reveals deficits in shoulder AROM and PROM, along with pain. Good overall strength in her shoulder. Hypomobility in posterior and inferior capsule. Patient's signs/symptoms consistent with that of an Adhesive capsulitis. Educated patient on this condition and prognosis. Introductory HEP established focusing on gentle ROM. Patient would benefit from skilled outpt PT to address deficits and improve abilities with all functional tasks. Impairments: abnormal or restricted ROM, lacks appropriate home exercise program and pain with function  Functional limitations: Diffculties with reaching overhead, haircare, grooming, applying deodorant, lifting tasks, pushing/pulling tasks, sleeping, etcUnderstanding of Dx/Px/POC: good   Prognosis: good    Goals  STG (4 weeks):  1) Education: Patient to demonstrate compliance with attendance of therapy sessions and performance of introductory HEP. 2) Pain: Patient to report day to day pain levels <5/10, allowing for improvements with all functional tasks. 3) AROM: Patient to demonstrate improved AROM L shoulder flexion >145 degrees, abduction >120 degrees, IR >45 degrees, ER >50 degrees, allowing for improvements with reaching overhead and grooming tasks. LTG (8 weeks):  1) Education: Patient to demonstrate compliance with attendance of therapy sessions and performance of progressive HEP, allowing for transition to self-management of symptoms.   2) Pain: Patient to report day to day pain levels <2/10 during her daily activities, allowing for improvements with all functional tasks. 3) ROM: Patient to demonstrate improved AROM L shoulder flexion >160 degrees, abduction >160 degrees, IR >75 degrees, ER >75 degrees, allowing for improvements with reaching overhead and grooming tasks. Plan  Patient would benefit from: skilled physical therapy  Planned modality interventions: cryotherapy, thermotherapy: hydrocollator packs and TENS  Planned therapy interventions: joint mobilization, manual therapy, neuromuscular re-education, stretching, strengthening, therapeutic activities, therapeutic exercise, patient education, functional ROM exercises and home exercise program  Frequency: 1x week  Duration in visits: 8  Treatment plan discussed with: patient        Subjective Evaluation    History of Present Illness  Mechanism of injury: Patient presents to therapy with c/c of L shoulder pain, ongoing for ~3 months, no STEVEN. She reports that pain began proximal shoulder region, with pain extending lateral shoulder, down to elbow and forearm at time. Patient takes Ibuprofen 800mg at nighttime to help her sleep. 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 She has noticed loss of ROM. She received an injection at her last ortho appt. Prior shoulder problem in June which resolved with HEP. Objective     Tenderness     Left Shoulder   Tenderness in the coracoid process.      Cervical/Thoracic Screen   Cervical range of motion within normal limits    Active Range of Motion   Left Shoulder   Flexion: 140 degrees with pain  Abduction: 100 degrees with pain  External rotation BTH: T2 with pain  Internal rotation BTB: L5 with pain    Passive Range of Motion   Left Shoulder   Flexion: 130 degrees with pain  Abduction: 100 degrees with pain  External rotation 45°: 40 degrees with pain  Internal rotation 45°: 20 degrees with pain    Joint Play   Left Shoulder  Hypomobile in the posterior capsule and inferior capsule. Strength/Myotome Testing     Left Shoulder     Planes of Motion   Flexion: 5   Abduction: 4+   External rotation at 0°: 4+   Internal rotation at 0°: 4+     Tests     Left Shoulder   Positive Hawkin's and Speed's. Negative empty can.               Precautions: none      Manuals 12/06                                                                Neuro Re-Ed                                                                                                        Ther Ex             Supine cane ER AAROM 2 x 10            Standing cane AAROM scaption 2 x 10            Standing cane abduction AAROM 2 x 10                                                                             Ther Activity                                       Gait Training                                       Modalities

## 2023-12-12 ENCOUNTER — OFFICE VISIT (OUTPATIENT)
Dept: PHYSICAL THERAPY | Age: 58
End: 2023-12-12
Payer: COMMERCIAL

## 2023-12-12 DIAGNOSIS — M75.02 ADHESIVE CAPSULITIS OF LEFT SHOULDER: Primary | ICD-10-CM

## 2023-12-12 PROCEDURE — 97110 THERAPEUTIC EXERCISES: CPT

## 2023-12-12 NOTE — PROGRESS NOTES
Daily Note     Today's date: 2023  Patient name: Cain Burkitt  : 1965  MRN: [id number]  Referring provider: Eliceo [place]  Dx:   Encounter Diagnosis     ICD-10-CM    1. Adhesive capsulitis of left shoulder  M75.02                      Subjective: Patient presents to therapy reporting she has had a couple of rough days regarding her shoulder, no reason why, but wonders if getting ready for [employer] could be contributing factor. Objective: See treatment diary below      Assessment: Tolerated treatment fair. Patient would benefit from continued PT. TE performed focusing on gentle shoulder stretching and ROM exercises, with emphasis on staying within minimal pain range and not pushing into pain. Patient has most pain with performance of abduction, able to ~90 degrees. ER ROM limited to ~40 degrees. Improved IR today, ~30 degrees. GHJ joint mobs performed in various directions for pain reduction/capsular mobility, which patient tolerated well. Patient noted less pain when donning coat post TE. Would continue to benefit from skilled outpt PT to address deficits and improve abilities with all functional tasks - will continue to progress as able to tolerate. Plan: Progress treatment as tolerated.        Precautions: none      Manuals                                                                Neuro Re-Ed                                                                                                        Ther Ex             Supine cane ER AAROM 2 x 10 2 x 10           Standing cane AAROM scaption 2 x 10 2 x 10           Standing cane abduction AAROM 2 x 10 2 x 10           OH pulleys flexion/abduction  2 x 10 each           Wall slides  2 x 10           Standing cane extension AAROM  2 x 10           Standing cane IR  2 x 10                        Ther Activity                                       Gait Training                                       Modalities

## 2023-12-21 ENCOUNTER — APPOINTMENT (OUTPATIENT)
Dept: PHYSICAL THERAPY | Age: 58
End: 2023-12-21
Payer: COMMERCIAL

## 2023-12-21 ENCOUNTER — OFFICE VISIT (OUTPATIENT)
Dept: INTERNAL MEDICINE CLINIC | Facility: CLINIC | Age: 58
End: 2023-12-21
Payer: COMMERCIAL

## 2023-12-21 VITALS
WEIGHT: 160 LBS | HEIGHT: 65 IN | OXYGEN SATURATION: 98 % | TEMPERATURE: 97.1 F | BODY MASS INDEX: 26.66 KG/M2 | DIASTOLIC BLOOD PRESSURE: 86 MMHG | HEART RATE: 65 BPM | SYSTOLIC BLOOD PRESSURE: 124 MMHG

## 2023-12-21 DIAGNOSIS — J06.9 UPPER RESPIRATORY TRACT INFECTION, UNSPECIFIED TYPE: ICD-10-CM

## 2023-12-21 DIAGNOSIS — R11.0 NAUSEA: ICD-10-CM

## 2023-12-21 DIAGNOSIS — R05.9 COUGH IN ADULT PATIENT: Primary | ICD-10-CM

## 2023-12-21 LAB
SARS-COV-2 AG UPPER RESP QL IA: NEGATIVE
VALID CONTROL: NORMAL

## 2023-12-21 PROCEDURE — 99213 OFFICE O/P EST LOW 20 MIN: CPT | Performed by: INTERNAL MEDICINE

## 2023-12-21 PROCEDURE — 87811 SARS-COV-2 COVID19 W/OPTIC: CPT | Performed by: INTERNAL MEDICINE

## 2023-12-21 RX ORDER — ONDANSETRON 4 MG/1
4 TABLET, FILM COATED ORAL EVERY 8 HOURS PRN
Qty: 20 TABLET | Refills: 0 | Status: SHIPPED | OUTPATIENT
Start: 2023-12-21

## 2023-12-21 RX ORDER — BENZONATATE 200 MG/1
200 CAPSULE ORAL 3 TIMES DAILY PRN
Qty: 20 CAPSULE | Refills: 0 | Status: SHIPPED | OUTPATIENT
Start: 2023-12-21

## 2023-12-21 RX ORDER — AZITHROMYCIN 250 MG/1
TABLET, FILM COATED ORAL DAILY
Qty: 6 TABLET | Refills: 0 | Status: SHIPPED | OUTPATIENT
Start: 2023-12-21 | End: 2023-12-25

## 2023-12-21 NOTE — PROGRESS NOTES
Assessment/Plan:    Diagnoses and all orders for this visit:    Cough in adult patient  -     POCT Rapid Covid Ag  -     benzonatate (TESSALON) 200 MG capsule; Take 1 capsule (200 mg total) by mouth 3 (three) times a day as needed for cough  -     azithromycin (Zithromax) 250 mg tablet; Take 2 tablets (500 mg total) by mouth daily for 1 day, THEN 1 tablet (250 mg total) daily for 4 days.    Upper respiratory tract infection, unspecified type  -     benzonatate (TESSALON) 200 MG capsule; Take 1 capsule (200 mg total) by mouth 3 (three) times a day as needed for cough  -     azithromycin (Zithromax) 250 mg tablet; Take 2 tablets (500 mg total) by mouth daily for 1 day, THEN 1 tablet (250 mg total) daily for 4 days.    Nausea  -     ondansetron (ZOFRAN) 4 mg tablet; Take 1 tablet (4 mg total) by mouth every 8 (eight) hours as needed for nausea or vomiting       Patient reports ongoing cough wheezing associated with some nausea for the last few days.  No fevers chills or shortness of breath.  Discussed supportive management follow-up as needed               There are no Patient Instructions on file for this visit.    Subjective:      Patient ID: Mikaela Edwards is a 58 y.o. female    HPI      Current Outpatient Medications:     albuterol (Ventolin HFA) 90 mcg/act inhaler, Inhale 2 puffs every 6 (six) hours as needed for wheezing, Disp: 18 g, Rfl: 0    aspirin 81 mg chewable tablet, Chew, Disp: , Rfl:     Bioflavonoid Products (MARÍA-C PO), Take by mouth, Disp: , Rfl:     calcium carbonate (TUMS) 500 mg chewable tablet, Chew 1 tablet daily, Disp: , Rfl:     cetirizine (ZyrTEC) 10 mg tablet, Take by mouth, Disp: , Rfl:     ibuprofen (MOTRIN) 800 mg tablet, Take 200 mg by mouth every 6 (six) hours as needed for mild pain, Disp: , Rfl:     Multiple Vitamins-Minerals (PRESERVISION AREDS 2 PO), Take by mouth, Disp: , Rfl:     ondansetron (ZOFRAN) 4 mg tablet, Take 1 tablet (4 mg total) by mouth every 8 (eight) hours as needed  for nausea or vomiting, Disp: 20 tablet, Rfl: 0    Prasterone (Intrarosa) 6.5 MG INST, Insert 6.5 mg into the vagina in the morning, Disp: 28 each, Rfl: 11    triamcinolone (KENALOG) 0.1 % cream, Apply topically 2 (two) times a day, Disp: 30 g, Rfl: 0    azithromycin (Zithromax) 250 mg tablet, Take 2 tablets (500 mg total) by mouth daily for 1 day, THEN 1 tablet (250 mg total) daily for 4 days., Disp: 6 tablet, Rfl: 0    benzonatate (TESSALON) 200 MG capsule, Take 1 capsule (200 mg total) by mouth 3 (three) times a day as needed for cough, Disp: 20 capsule, Rfl: 0     Past Medical History:   Diagnosis Date    Abnormal Pap smear of cervix     Allergic rhinitis     Anxiety and depression     Asthma     DDD (degenerative disc disease), lumbar     Factor 5 Leiden mutation, heterozygous (HCC)     Migraine     Seizures (HCC)     last-         Past Surgical History:   Procedure Laterality Date    ABDOMINOPLASTY      AUGMENTATION BREAST      CERVICAL CONE BIOPSY       SECTION      ENDOMETRIAL ABLATION      age 44    MAMMO (HISTORICAL)      ROTATOR CUFF REPAIR      TONSILLECTOMY      TUBAL LIGATION           Allergies   Allergen Reactions    Carbamazepine Anaphylaxis    Elemental Sulfur Anaphylaxis    Phenytoin Anaphylaxis    Succinylcholine Myalgia       Recent Results (from the past 1008 hour(s))   POCT Rapid Covid Ag    Collection Time: 23 12:42 PM   Result Value Ref Range    POCT SARS-CoV-2 Ag Negative Negative    VALID CONTROL Valid        The following portions of the patient's history were reviewed and updated as appropriate: allergies, current medications, past family history, past medical history, past social history, past surgical history and problem list.     Review of Systems   Constitutional:  Positive for chills and fatigue. Negative for activity change, appetite change, diaphoresis, fever and unexpected weight change.   HENT:  Negative for congestion, drooling, ear discharge, ear  pain, facial swelling, hearing loss, postnasal drip, rhinorrhea, sinus pressure, sinus pain, sneezing, sore throat, tinnitus, trouble swallowing and voice change.    Eyes:  Negative for discharge.   Respiratory:  Positive for cough and wheezing. Negative for apnea, choking, chest tightness, shortness of breath and stridor.    Cardiovascular:  Negative for chest pain, palpitations and leg swelling.   Gastrointestinal:  Positive for nausea. Negative for abdominal distention, abdominal pain, anal bleeding, blood in stool, constipation, diarrhea and vomiting.   Genitourinary:  Negative for decreased urine volume, difficulty urinating, frequency and urgency.   Musculoskeletal:  Negative for arthralgias, back pain and myalgias.   Skin:  Negative for color change.   Neurological:  Positive for headaches. Negative for dizziness, light-headedness and numbness.         Objective:      Vitals:    12/21/23 1227   BP: 124/86   Pulse: 65   Temp: (!) 97.1 °F (36.2 °C)   SpO2: 98%          Physical Exam  Vitals reviewed.   Constitutional:       General: She is not in acute distress.     Appearance: Normal appearance. She is not ill-appearing, toxic-appearing or diaphoretic.   HENT:      Mouth/Throat:      Mouth: Mucous membranes are moist.   Cardiovascular:      Rate and Rhythm: Normal rate and regular rhythm.      Pulses: Normal pulses.      Heart sounds: Normal heart sounds. No murmur heard.     No friction rub. No gallop.   Pulmonary:      Effort: Pulmonary effort is normal. No respiratory distress.      Breath sounds: No stridor. Wheezing present. No rhonchi or rales.   Chest:      Chest wall: No tenderness.   Musculoskeletal:      Right lower leg: No edema.      Left lower leg: No edema.   Skin:     General: Skin is warm and dry.      Findings: No lesion or rash.   Neurological:      Mental Status: She is alert.

## 2024-01-17 ENCOUNTER — EVALUATION (OUTPATIENT)
Dept: PHYSICAL THERAPY | Age: 59
End: 2024-01-17
Payer: COMMERCIAL

## 2024-01-17 DIAGNOSIS — M12.812 ROTATOR CUFF ARTHROPATHY OF LEFT SHOULDER: ICD-10-CM

## 2024-01-17 DIAGNOSIS — M75.82 TENDINITIS OF LEFT ROTATOR CUFF: ICD-10-CM

## 2024-01-17 DIAGNOSIS — M75.02 ADHESIVE CAPSULITIS OF LEFT SHOULDER: Primary | ICD-10-CM

## 2024-01-17 PROCEDURE — 97164 PT RE-EVAL EST PLAN CARE: CPT

## 2024-01-17 PROCEDURE — 97110 THERAPEUTIC EXERCISES: CPT

## 2024-01-17 NOTE — PROGRESS NOTES
PT Re-Evaluation     Today's date: 2024  Patient name: Mikaela Edwards  : 1965  MRN: [id number]  Referring provider: Erasmo William*  Dx:   Encounter Diagnosis     ICD-10-CM    1. Adhesive capsulitis of left shoulder  M75.02       2. Tendinitis of left rotator cuff  M75.82 Ambulatory referral to Physical Therapy      3. Rotator cuff arthropathy of left shoulder  M12.812 Ambulatory referral to Physical Therapy          Start Time:   Stop Time:   Total time in clinic (min): 50 minutes    Assessment  Assessment details: Patient presents for first visit in 4 weeks due to being sick. Patient presents with new onset of R shoulder pain, as well as continuing pain and ROM loss in L should. Re-evaluation performed to assess current status and limitations, including assessment of R shoulder. Patient demonstrates continued ROM deficits L shoulder in all directions, with signs/symptoms consistent with that of adhesive capsulitis. Adjusted goals based on current status. Patient will benefit from skilled outpt PT to address deficits and improve abilities with all functional tasks. Plan to restart patient's POC, and plan to see her 1x/week for an additional 8 visits.   Impairments: abnormal or restricted ROM and pain with function  Functional limitations: Difficulties with reaching overhead, placing objects on shelf overhead, work tasks, lifting, and sleepingUnderstanding of Dx/Px/POC: good   Prognosis: fair    Goals  STG (4 weeks):  1) Education: Patient to demonstrate compliance with attendance of therapy sessions and performance of introductory HEP.  2) Pain: Patient to report day to day pain levels <6/10, allowing for improvements with all functional tasks.   3) AROM: Patient to demonstrate improved AROM L shoulder flexion >145 degrees, abduction >120 degrees, IR BTB to L5 or greater, ER >C7 or greater, allowing for improvements with reaching overhead and grooming tasks.      LTG (8 weeks):  1)  Education: Patient to demonstrate compliance with attendance of therapy sessions and performance of progressive HEP, allowing for transition to self-management of symptoms.  2) Pain: Patient to report day to day pain levels <2/10 during her daily activities, allowing for improvements with all functional tasks.   3) ROM: Patient to demonstrate improved AROM L shoulder flexion >160 degrees, abduction >160 degrees, IR to bra-line, ER to T3 or better, allowing for improvements with reaching overhead and grooming tasks.       Plan  Planned modality interventions: cryotherapy, TENS and thermotherapy: hydrocollator packs  Planned therapy interventions: home exercise program, functional ROM exercises, flexibility, joint mobilization, manual therapy, patient education, strengthening, stretching, therapeutic exercise and therapeutic activities  Frequency: 1x week  Duration in visits: 8  Treatment plan discussed with: patient        Subjective Evaluation    History of Present Illness  Mechanism of injury: Patient presents to PT for first visit since 12/12, as patient was sick. Patient presents reporting continuation of L shoulder pain and ROM restrictions, but notes she has begun feeling pain in her R shoulder. Due to prolonged time since last session, and recent onset of R shoulder pain, re-evaluation performed today, with updated objective measurements and goals based upon current measurements.         Objective     Active Range of Motion   Left Shoulder   Flexion: 115 degrees with pain  Abduction: 110 degrees with pain  External rotation BTH: Active external rotation behind the head: to scapula. with pain  Internal rotation BTB: Active internal rotation behind the back: to PSIS. with pain    Right Shoulder   Flexion: WFL  Abduction: WFL  External rotation BTH: WFL  Internal rotation BTB: Active internal rotation behind the back: bra-line.              Precautions: Adhesive Capsulitis       Manuals 12/06 12/12 01/17                                                                                                                  Neuro Re-Ed                                                                                                                                                                                               Ther Ex                       Supine cane ER AAROM 2 x 10 2 x 10  2 x 10                 Standing cane AAROM scaption 2 x 10 2 x 10  2 x 10                 Standing cane abduction AAROM 2 x 10 2 x 10  2 x 10                 OH pulleys flexion/abduction   2 x 10 each  5' flexion                 Wall slides   2 x 10  2 x 10. 5 sec                 Standing cane extension AAROM   2 x 10                   Standing cane IR   2 x 10                    Supine cane flexion     2 x 10, 2lbs                 Ther Activity                                                                       Gait Training                                                                       Modalities

## 2024-01-25 ENCOUNTER — OFFICE VISIT (OUTPATIENT)
Dept: PHYSICAL THERAPY | Age: 59
End: 2024-01-25
Payer: COMMERCIAL

## 2024-01-25 DIAGNOSIS — M75.02 ADHESIVE CAPSULITIS OF LEFT SHOULDER: Primary | ICD-10-CM

## 2024-01-25 PROCEDURE — 97110 THERAPEUTIC EXERCISES: CPT

## 2024-01-25 NOTE — PROGRESS NOTES
Daily Note     Today's date: 2024  Patient name: Mikaela Edwards  : 1965  MRN: [id number]  Referring provider: Johana Rogel MD  Dx:   Encounter Diagnosis     ICD-10-CM    1. Adhesive capsulitis of left shoulder  M75.02                      Subjective: Patient presents to therapy reporting that she is doing well, stating she believes she has some improved motion and less pain in her shoulder.       Objective: See treatment diary below      Flexion: 130 degrees  Abduction: 110 degrees    Assessment: Tolerated treatment well. Patient would benefit from continued PT. TE performed to address deficits in shoulder ROM in various directions with signs/symptoms consistent with that of adhesive capsulitis. Patient did demonstrate a 15 degree improvement in overhead flexion AROM today compared to just last week. Other motions remained similar to last weeks measurements. Patient noted less pain with various TE today, and more motion with OH pulleys and wall slide TE. Added towel stretch to improve IR ROM, where patient has deficits. Most deficits remain in ER ROM. Patient will continue to benefit from skilled outpt PT to address deficits and improve abilities with all functional tasks. Discussed with patient aspect of time in healing her condition, along with gentle ROM within pain-free tolerance, with emphasis not to push into pain. Plan to see patient in two weeks, with emphasis on continuing HEP to work on motion. Patient in agreement with plan.       Plan: Progress treatment as tolerated.       Precautions: Adhesive Capsulitis  [employer] - 3 units/day       Manuals                                                                                                                Neuro Re-Ed                                                                                                                                                                                               Ther Ex                        Supine cane ER AAROM 2 x 10 2 x 10  2 x 10  2 x 10               Standing cane AAROM scaption 2 x 10 2 x 10  2 x 10  2 x 10               Standing cane abduction AAROM 2 x 10 2 x 10  2 x 10  2 x 10               OH pulleys flexion/abduction   2 x 10 each  5' flexion  5' flexion , 5' abduction               Wall slides   2 x 10  2 x 10. 5 sec  2 x 10, 5 sec holds               Standing cane extension AAROM   2 x 10                   Standing cane IR   2 x 10                    Supine cane flexion     2 x 10, 2lbs  2 x 10, 3lbs               IR towel stretch    2 x 10, 5 sec holds         Ther Activity                                                                       Gait Training                                                                       Modalities

## 2024-03-27 ENCOUNTER — OFFICE VISIT (OUTPATIENT)
Dept: INTERNAL MEDICINE CLINIC | Facility: CLINIC | Age: 59
End: 2024-03-27
Payer: COMMERCIAL

## 2024-03-27 VITALS
HEART RATE: 72 BPM | HEIGHT: 65 IN | TEMPERATURE: 98.1 F | WEIGHT: 167 LBS | OXYGEN SATURATION: 99 % | SYSTOLIC BLOOD PRESSURE: 120 MMHG | DIASTOLIC BLOOD PRESSURE: 78 MMHG | BODY MASS INDEX: 27.82 KG/M2

## 2024-03-27 DIAGNOSIS — J02.9 PHARYNGITIS, UNSPECIFIED ETIOLOGY: Primary | ICD-10-CM

## 2024-03-27 LAB
S PYO AG THROAT QL: NEGATIVE
SARS-COV-2 AG UPPER RESP QL IA: NEGATIVE
SL AMB POCT RAPID FLU A: NEGATIVE
SL AMB POCT RAPID FLU B: NEGATIVE
VALID CONTROL: NORMAL

## 2024-03-27 PROCEDURE — 99213 OFFICE O/P EST LOW 20 MIN: CPT | Performed by: INTERNAL MEDICINE

## 2024-03-27 PROCEDURE — 87880 STREP A ASSAY W/OPTIC: CPT | Performed by: INTERNAL MEDICINE

## 2024-03-27 PROCEDURE — 87811 SARS-COV-2 COVID19 W/OPTIC: CPT | Performed by: INTERNAL MEDICINE

## 2024-03-27 PROCEDURE — 87804 INFLUENZA ASSAY W/OPTIC: CPT | Performed by: INTERNAL MEDICINE

## 2024-03-27 RX ORDER — AMOXICILLIN AND CLAVULANATE POTASSIUM 875; 125 MG/1; MG/1
1 TABLET, FILM COATED ORAL EVERY 12 HOURS SCHEDULED
Qty: 10 TABLET | Refills: 0 | Status: SHIPPED | OUTPATIENT
Start: 2024-03-27 | End: 2024-04-01

## 2024-03-27 NOTE — PROGRESS NOTES
Assessment/Plan:    Diagnoses and all orders for this visit:    Pharyngitis, unspecified etiology       Sore throat and congestions since yesterday, painful neck glands         There are no Patient Instructions on file for this visit.    Subjective:      Patient ID: Mikaela Edwards is a 59 y.o. female    HPI      Current Outpatient Medications:     albuterol (Ventolin HFA) 90 mcg/act inhaler, Inhale 2 puffs every 6 (six) hours as needed for wheezing, Disp: 18 g, Rfl: 0    aspirin 81 mg chewable tablet, Chew, Disp: , Rfl:     Bioflavonoid Products (MARÍA-C PO), Take by mouth, Disp: , Rfl:     calcium carbonate (TUMS) 500 mg chewable tablet, Chew 1 tablet daily, Disp: , Rfl:     cetirizine (ZyrTEC) 10 mg tablet, Take by mouth, Disp: , Rfl:     ibuprofen (MOTRIN) 800 mg tablet, Take 200 mg by mouth every 6 (six) hours as needed for mild pain, Disp: , Rfl:     Multiple Vitamins-Minerals (PRESERVISION AREDS 2 PO), Take by mouth, Disp: , Rfl:     triamcinolone (KENALOG) 0.1 % cream, Apply topically 2 (two) times a day, Disp: 30 g, Rfl: 0    Prasterone (Intrarosa) 6.5 MG INST, Insert 6.5 mg into the vagina in the morning (Patient not taking: Reported on 3/27/2024), Disp: 28 each, Rfl: 11     Past Medical History:   Diagnosis Date    Abnormal Pap smear of cervix     Allergic rhinitis     Anxiety and depression     Asthma     DDD (degenerative disc disease), lumbar     Factor 5 Leiden mutation, heterozygous (HCC)     Migraine     Seizures ([facility] [place])     last-         Past Surgical History:   Procedure Laterality Date    ABDOMINOPLASTY      AUGMENTATION BREAST      CERVICAL CONE BIOPSY       SECTION      ENDOMETRIAL ABLATION      age 44    MAMMO (HISTORICAL)      ROTATOR CUFF REPAIR      TONSILLECTOMY      TUBAL LIGATION           Allergies   Allergen Reactions    Carbamazepine Anaphylaxis    Elemental Sulfur Anaphylaxis    Phenytoin Anaphylaxis    Succinylcholine Myalgia       No results found for this or any  previous visit (from the past 1008 hour(s)).    The following portions of the patient's history were reviewed and updated as appropriate: allergies, current medications, past family history, past medical history, past social history, past surgical history and problem list.     Review of Systems   Constitutional:  Negative for activity change, appetite change, chills, diaphoresis, fatigue, fever and unexpected weight change.   HENT:  Positive for congestion and sore throat. Negative for drooling, ear discharge, ear pain, facial swelling, hearing loss, postnasal drip, rhinorrhea, sinus pressure, sinus pain, sneezing, tinnitus, trouble swallowing and voice change.    Eyes:  Negative for discharge.   Respiratory:  Negative for apnea, cough, choking, chest tightness, shortness of breath, wheezing and stridor.    Cardiovascular:  Negative for chest pain, palpitations and leg swelling.   Gastrointestinal:  Negative for abdominal distention, abdominal pain, anal bleeding, blood in stool, constipation, diarrhea, nausea and vomiting.   Genitourinary:  Negative for decreased urine volume, difficulty urinating, frequency and urgency.   Musculoskeletal:  Negative for arthralgias, back pain and myalgias.   Skin:  Negative for color change.   Neurological:  Positive for headaches. Negative for dizziness, light-headedness and numbness.         Objective:      Vitals:    03/27/24 1324   BP: 120/78   Pulse: 72   Temp: 98.1 °F (36.7 °C)   SpO2: 99%          Physical Exam  Vitals reviewed.   Constitutional:       General: She is not in acute distress.     Appearance: Normal appearance. She is not ill-appearing, toxic-appearing or diaphoretic.   HENT:      Right Ear: Tympanic membrane normal.      Left Ear: Tympanic membrane normal.      Mouth/Throat:      Mouth: Mucous membranes are moist.      Pharynx: Posterior oropharyngeal erythema present. No oropharyngeal exudate.   Cardiovascular:      Rate and Rhythm: Normal rate and regular  rhythm.      Pulses: Normal pulses.      Heart sounds: Normal heart sounds. No murmur heard.     No friction rub. No gallop.   Pulmonary:      Effort: Pulmonary effort is normal. No respiratory distress.      Breath sounds: Normal breath sounds. No stridor. No wheezing, rhonchi or rales.   Chest:      Chest wall: No tenderness.   Musculoskeletal:      Right lower leg: No edema.      Left lower leg: No edema.   Lymphadenopathy:      Cervical: Cervical adenopathy present.      Right cervical: Superficial cervical adenopathy present.      Left cervical: No superficial cervical adenopathy.   Skin:     General: Skin is warm and dry.      Findings: No lesion or rash.   Neurological:      Mental Status: She is alert.

## 2024-05-22 ENCOUNTER — OFFICE VISIT (OUTPATIENT)
Dept: INTERNAL MEDICINE CLINIC | Facility: CLINIC | Age: 59
End: 2024-05-22
Payer: COMMERCIAL

## 2024-05-22 VITALS
HEART RATE: 93 BPM | WEIGHT: 165 LBS | TEMPERATURE: 97.2 F | DIASTOLIC BLOOD PRESSURE: 82 MMHG | HEIGHT: 65 IN | SYSTOLIC BLOOD PRESSURE: 128 MMHG | BODY MASS INDEX: 27.49 KG/M2 | OXYGEN SATURATION: 97 %

## 2024-05-22 DIAGNOSIS — M51.16 LUMBAR DISC HERNIATION WITH RADICULOPATHY: ICD-10-CM

## 2024-05-22 DIAGNOSIS — J01.00 ACUTE MAXILLARY SINUSITIS, RECURRENCE NOT SPECIFIED: Primary | ICD-10-CM

## 2024-05-22 PROCEDURE — 99213 OFFICE O/P EST LOW 20 MIN: CPT | Performed by: INTERNAL MEDICINE

## 2024-05-22 RX ORDER — AZITHROMYCIN 250 MG/1
TABLET, FILM COATED ORAL
Qty: 6 TABLET | Refills: 0 | Status: SHIPPED | OUTPATIENT
Start: 2024-05-22 | End: 2024-05-27

## 2024-05-22 NOTE — PROGRESS NOTES
Assessment/Plan:      Depression Screening and Follow-up Plan: Patient was screened for depression during today's encounter. They screened negative with a PHQ-9 score of 0.            1. Acute maxillary sinusitis, recurrence not specified  -     azithromycin (Zithromax) 250 mg tablet; Take 2 tablets (500 mg total) by mouth daily for 1 day, THEN 1 tablet (250 mg total) daily for 4 days.  2. Lumbar disc herniation with radiculopathy         Subjective:      Patient ID: Mikaela Edwards is a 59 y.o. female.    Sinus congestion, feeling chills, fever,    Sinusitis  Associated symptoms include congestion. Pertinent negatives include no chills, coughing, ear pain, headaches, shortness of breath or sore throat.       The following portions of the patient's history were reviewed and updated as appropriate: She  has a past medical history of Abnormal Pap smear of cervix, Allergic rhinitis, Anxiety and depression, Asthma, DDD (degenerative disc disease), lumbar, Factor 5 Leiden mutation, heterozygous (HCC), Migraine, and Seizures ([facility]).  She   Patient Active Problem List    Diagnosis Date Noted    Adhesive capsulitis of left shoulder 11/27/2023    Tendinitis of left rotator cuff 09/21/2023    Acute pain of left shoulder 09/21/2023    Muscle cramp 06/28/2023    Trigeminal neuralgia 06/28/2023    Eczema 03/08/2023    Lower esophageal ring (Schatzki) 04/15/2021    Osteopenia after menopause 04/15/2021    Post-menopausal osteoporosis 04/15/2021    Mixed hyperlipidemia 04/15/2021    Other dysphagia 01/20/2021    Gastroesophageal reflux disease 01/20/2021    Mild intermittent asthma without complication 10/02/2020    Migraine without aura and without status migrainosus, not intractable 10/02/2020    Lumbar disc herniation with radiculopathy 10/02/2020    Disorder of rotator cuff, right 10/02/2020    Dysthymic disorder 10/02/2020    Screening mammogram for breast cancer 10/02/2020    Body mass index 26.0-26.9, adult 10/02/2020     Fatigue 10/02/2020    Asthma 2013    Factor V deficiency (HCC) 2013    Exudative senile macular degeneration of retina (HCC) 2013     She  has a past surgical history that includes  section; AUGMENTATION BREAST; Endometrial ablation; Tubal ligation; Abdominoplasty; Cervical cone biopsy (); Rotator cuff repair; Tonsillectomy; and Mammo (historical) ().  Her family history includes Bipolar disorder in her son; Cancer in her maternal grandfather, maternal grandmother, mother, paternal grandfather, and paternal grandmother; Diabetes in her father; Heart disease in her father; Hypertension in her brother and father; Lung cancer in her mother; Post-traumatic stress disorder in her son; Rheum arthritis in her father; Stroke in her father; Thyroid disease in her father and sister.  She  reports that she quit smoking about 37 years ago. Her smoking use included cigarettes. She has never used smokeless tobacco. She reports that she does not drink alcohol and does not use drugs.  Current Outpatient Medications   Medication Sig Dispense Refill    albuterol (Ventolin HFA) 90 mcg/act inhaler Inhale 2 puffs every 6 (six) hours as needed for wheezing 18 g 0    aspirin 81 mg chewable tablet Chew      azithromycin (Zithromax) 250 mg tablet Take 2 tablets (500 mg total) by mouth daily for 1 day, THEN 1 tablet (250 mg total) daily for 4 days. 6 tablet 0    Bioflavonoid Products (MARÍA-C PO) Take by mouth      calcium carbonate (TUMS) 500 mg chewable tablet Chew 1 tablet daily      cetirizine (ZyrTEC) 10 mg tablet Take by mouth      ibuprofen (MOTRIN) 800 mg tablet Take 200 mg by mouth every 6 (six) hours as needed for mild pain      Multiple Vitamins-Minerals (PRESERVISION AREDS 2 PO) Take by mouth      triamcinolone (KENALOG) 0.1 % cream Apply topically 2 (two) times a day (Patient taking differently: Apply topically 2 (two) times a day As needed) 30 g 0    Prasterone (Intrarosa) 6.5 MG INST Insert  6.5 mg into the vagina in the morning (Patient not taking: Reported on 5/22/2024) 28 each 11     No current facility-administered medications for this visit.     Current Outpatient Medications on File Prior to Visit   Medication Sig    albuterol (Ventolin HFA) 90 mcg/act inhaler Inhale 2 puffs every 6 (six) hours as needed for wheezing    aspirin 81 mg chewable tablet Chew    Bioflavonoid Products (MARÍA-C PO) Take by mouth    calcium carbonate (TUMS) 500 mg chewable tablet Chew 1 tablet daily    cetirizine (ZyrTEC) 10 mg tablet Take by mouth    ibuprofen (MOTRIN) 800 mg tablet Take 200 mg by mouth every 6 (six) hours as needed for mild pain    Multiple Vitamins-Minerals (PRESERVISION AREDS 2 PO) Take by mouth    triamcinolone (KENALOG) 0.1 % cream Apply topically 2 (two) times a day (Patient taking differently: Apply topically 2 (two) times a day As needed)    Prasterone (Intrarosa) 6.5 MG INST Insert 6.5 mg into the vagina in the morning (Patient not taking: Reported on 5/22/2024)     No current facility-administered medications on file prior to visit.     She is allergic to carbamazepine, elemental sulfur, phenytoin, and succinylcholine..    Review of Systems   Constitutional:  Positive for fever. Negative for chills.   HENT:  Positive for congestion. Negative for ear pain and sore throat.    Eyes:  Negative for pain.   Respiratory:  Negative for cough and shortness of breath.    Cardiovascular:  Negative for chest pain and leg swelling.   Gastrointestinal:  Negative for abdominal pain, nausea and vomiting.   Endocrine: Negative for polyuria.   Genitourinary:  Negative for difficulty urinating, frequency and urgency.   Musculoskeletal:  Negative for arthralgias and back pain.   Skin:  Negative for rash.   Neurological:  Negative for weakness and headaches.   Psychiatric/Behavioral:  Negative for sleep disturbance. The patient is not nervous/anxious.          Objective:      /82 (BP Location: Right arm,  "Patient Position: Sitting, Cuff Size: Standard)   Pulse 93   Temp (!) 97.2 °F (36.2 °C) (Temporal)   Ht 5' 5\" (1.651 m)   Wt 74.8 kg (165 lb)   LMP  (LMP Unknown)   SpO2 97%   BMI 27.46 kg/m²     Recent Results (from the past 1344 hour(s))   POCT rapid ANTIGEN strepA    Collection Time: 03/27/24  5:01 PM   Result Value Ref Range     RAPID STREP A Negative Negative   POCT rapid flu A and B    Collection Time: 03/27/24  5:02 PM   Result Value Ref Range    RAPID FLU A negative     RAPID FLU B negative    POCT Rapid Covid Ag    Collection Time: 03/27/24  5:02 PM   Result Value Ref Range    POCT SARS-CoV-2 Ag Negative Negative    VALID CONTROL Valid         Physical Exam  Constitutional:       Appearance: Normal appearance.   HENT:      Head: Normocephalic.      Right Ear: Tympanic membrane, ear canal and external ear normal.      Left Ear: Tympanic membrane, ear canal and external ear normal.      Nose: Nose normal. No congestion.      Mouth/Throat:      Mouth: Mucous membranes are moist.      Pharynx: Oropharynx is clear. No oropharyngeal exudate or posterior oropharyngeal erythema.   Eyes:      Extraocular Movements: Extraocular movements intact.      Conjunctiva/sclera: Conjunctivae normal.   Cardiovascular:      Rate and Rhythm: Normal rate and regular rhythm.      Heart sounds: Normal heart sounds. No murmur heard.  Pulmonary:      Effort: Pulmonary effort is normal.      Breath sounds: Normal breath sounds. No wheezing or rales.   Abdominal:      General: Abdomen is flat. There is no distension.      Palpations: Abdomen is soft.      Tenderness: There is no abdominal tenderness.   Musculoskeletal:      Cervical back: Normal range of motion and neck supple.      Right lower leg: No edema.      Left lower leg: No edema.   Lymphadenopathy:      Cervical: No cervical adenopathy.   Skin:     General: Skin is warm.   Neurological:      General: No focal deficit present.      Mental Status: She is alert and " oriented to person, place, and time.

## 2024-06-03 ENCOUNTER — TELEPHONE (OUTPATIENT)
Dept: INTERNAL MEDICINE CLINIC | Facility: CLINIC | Age: 59
End: 2024-06-03

## 2024-06-03 DIAGNOSIS — M51.16 LUMBAR DISC HERNIATION WITH RADICULOPATHY: Primary | ICD-10-CM

## 2024-06-03 RX ORDER — METHYLPREDNISOLONE 4 MG/1
TABLET ORAL
Qty: 21 EACH | Refills: 0 | Status: SHIPPED | OUTPATIENT
Start: 2024-06-03

## 2024-06-03 NOTE — TELEPHONE ENCOUNTER
Patient called the RX Refill Line. Message is being forwarded to the office.     Patient is requesting a prescription for a medrol dosepack because her back went out this weekend. She would like it sent to [facility]/pharmacy #[id number] - [place], [place] - [address] [phone]    Please contact patient at  [phone]

## 2024-08-27 ENCOUNTER — ANESTHESIA (OUTPATIENT)
Dept: ANESTHESIOLOGY | Facility: HOSPITAL | Age: 59
End: 2024-08-27

## 2024-08-27 ENCOUNTER — ANESTHESIA EVENT (OUTPATIENT)
Dept: ANESTHESIOLOGY | Facility: HOSPITAL | Age: 59
End: 2024-08-27

## 2024-08-27 NOTE — ANESTHESIA PREPROCEDURE EVALUATION
Procedure:  PRE-OP ONLY    Asthma - Albuterol  Relevant Problems   CARDIO   (+) Migraine without aura and without status migrainosus, not intractable   (+) Mixed hyperlipidemia      GI/HEPATIC   (+) Gastroesophageal reflux disease   (+) Other dysphagia      HEMATOLOGY   (+) Factor V deficiency (HCC)      MUSCULOSKELETAL   (+) Adhesive capsulitis of left shoulder      NEURO/PSYCH   (+) Dysthymic disorder   (+) Migraine without aura and without status migrainosus, not intractable      PULMONARY   (+) Asthma   (+) Mild intermittent asthma without complication             Anesthesia Plan  ASA Score- 2     Anesthesia Type- IV sedation with anesthesia with ASA Monitors.         Additional Monitors:     Airway Plan:            Plan Factors-    Chart reviewed.   Existing labs reviewed. Patient summary reviewed.                  Induction-     Postoperative Plan-         Informed Consent-

## 2024-08-28 ENCOUNTER — HOSPITAL ENCOUNTER (OUTPATIENT)
Dept: GASTROENTEROLOGY | Facility: HOSPITAL | Age: 59
Setting detail: OUTPATIENT SURGERY
Discharge: HOME | End: 2024-08-28
Attending: INTERNAL MEDICINE
Payer: COMMERCIAL

## 2024-08-28 ENCOUNTER — ANESTHESIA (OUTPATIENT)
Dept: GASTROENTEROLOGY | Facility: HOSPITAL | Age: 59
End: 2024-08-28

## 2024-08-28 ENCOUNTER — ANESTHESIA EVENT (OUTPATIENT)
Dept: GASTROENTEROLOGY | Facility: HOSPITAL | Age: 59
End: 2024-08-28

## 2024-08-28 VITALS
HEIGHT: 65 IN | DIASTOLIC BLOOD PRESSURE: 81 MMHG | RESPIRATION RATE: 20 BRPM | BODY MASS INDEX: 27.32 KG/M2 | OXYGEN SATURATION: 97 % | TEMPERATURE: 96.1 F | WEIGHT: 164 LBS | SYSTOLIC BLOOD PRESSURE: 125 MMHG | HEART RATE: 78 BPM

## 2024-08-28 DIAGNOSIS — R47.02 DYSPHASIA: ICD-10-CM

## 2024-08-28 PROCEDURE — 88305 TISSUE EXAM BY PATHOLOGIST: CPT | Performed by: SPECIALIST

## 2024-08-28 RX ORDER — PANTOPRAZOLE SODIUM 40 MG/1
40 TABLET, DELAYED RELEASE ORAL DAILY
COMMUNITY

## 2024-08-28 RX ORDER — SODIUM CHLORIDE 9 MG/ML
INJECTION, SOLUTION INTRAVENOUS CONTINUOUS PRN
Status: DISCONTINUED | OUTPATIENT
Start: 2024-08-28 | End: 2024-08-28

## 2024-08-28 RX ORDER — PROPOFOL 10 MG/ML
INJECTION, EMULSION INTRAVENOUS AS NEEDED
Status: DISCONTINUED | OUTPATIENT
Start: 2024-08-28 | End: 2024-08-28

## 2024-08-28 RX ORDER — LIDOCAINE HYDROCHLORIDE 10 MG/ML
INJECTION, SOLUTION EPIDURAL; INFILTRATION; INTRACAUDAL; PERINEURAL AS NEEDED
Status: DISCONTINUED | OUTPATIENT
Start: 2024-08-28 | End: 2024-08-28

## 2024-08-28 RX ADMIN — PROPOFOL 30 MG: 10 INJECTION, EMULSION INTRAVENOUS at 13:34

## 2024-08-28 RX ADMIN — LIDOCAINE HYDROCHLORIDE 80 MG: 10 INJECTION, SOLUTION EPIDURAL; INFILTRATION; INTRACAUDAL; PERINEURAL at 13:31

## 2024-08-28 RX ADMIN — PROPOFOL 100 MG: 10 INJECTION, EMULSION INTRAVENOUS at 13:32

## 2024-08-28 RX ADMIN — SODIUM CHLORIDE: 0.9 INJECTION, SOLUTION INTRAVENOUS at 12:57

## 2024-08-28 RX ADMIN — PROPOFOL 20 MG: 10 INJECTION, EMULSION INTRAVENOUS at 13:38

## 2024-08-28 RX ADMIN — PROPOFOL 20 MG: 10 INJECTION, EMULSION INTRAVENOUS at 13:36

## 2024-08-28 NOTE — ANESTHESIA POSTPROCEDURE EVALUATION
Post-Op Assessment Note    CV Status:  Stable  Pain Score: 0    Pain management: adequate       Mental Status:  Alert and awake   Hydration Status:  Euvolemic   PONV Controlled:  Controlled   Airway Patency:  Patent     Post Op Vitals Reviewed: Yes    No anethesia notable event occurred.    Staff: Anesthesiologist, CRNA               BP   128/73   Temp      Pulse     Resp   18   SpO2   99%

## 2024-08-28 NOTE — ANESTHESIA PREPROCEDURE EVALUATION
Procedure:  EGD  Asthma - Albuterol - well controlled  Hx childhood seizures off medications for many years no seizures since    Denies the following: CP/SOB with exertion, COPD, AMARJIT, stroke/TIA    Relevant Problems   CARDIO   (+) Migraine without aura and without status migrainosus, not intractable   (+) Mixed hyperlipidemia      GI/HEPATIC   (+) Gastroesophageal reflux disease   (+) Other dysphagia      HEMATOLOGY   (+) Factor V deficiency (HCC)      MUSCULOSKELETAL   (+) Adhesive capsulitis of left shoulder      NEURO/PSYCH   (+) Dysthymic disorder   (+) Migraine without aura and without status migrainosus, not intractable      PULMONARY   (+) Asthma   (+) Mild intermittent asthma without complication        Physical Exam    Airway    Mallampati score: II  TM Distance: >3 FB  Neck ROM: full     Dental   No notable dental hx     Cardiovascular      Pulmonary      Other Findings  post-pubertal.      Anesthesia Plan  ASA Score- 2     Anesthesia Type- IV sedation with anesthesia with ASA Monitors.         Additional Monitors:     Airway Plan:            Plan Factors-Exercise tolerance (METS): >4 METS.    Chart reviewed. EKG reviewed.  Existing labs reviewed. Patient summary reviewed.    Patient is not a current smoker.      Obstructive sleep apnea risk education given perioperatively.        Induction-     Postoperative Plan-         Informed Consent- Anesthetic plan and risks discussed with patient.  I personally reviewed this patient with the CRNA. Discussed and agreed on the Anesthesia Plan with the CRNA..

## 2024-08-28 NOTE — H&P
History and Physical - [facility]  Mikaela Edwards 59 y.o. female MRN: [id number]                  HPI: Mikaela Edwards is a 59 y.o. year old female who presents for EGD, esophageal biopsy and esophageal dilatation.  Indications for the procedure: Recurrent dysphagia, history of eosinophilic esophagitis, history of Schatzki's ring.      REVIEW OF SYSTEMS: Per the HPI, and otherwise unremarkable.    Historical Information   Past Medical History:   Diagnosis Date    Abnormal Pap smear of cervix     Allergic rhinitis     Anxiety and depression     Asthma     DDD (degenerative disc disease), lumbar     Factor 5 Leiden mutation, heterozygous (HCC)     Migraine     Seizures (HCC)     last-     Past Surgical History:   Procedure Laterality Date    ABDOMINOPLASTY      AUGMENTATION BREAST      CERVICAL CONE BIOPSY       SECTION      ENDOMETRIAL ABLATION      age 44    MAMMO (HISTORICAL)      ROTATOR CUFF REPAIR      TONSILLECTOMY      TUBAL LIGATION       Social History   Social History     Substance and Sexual Activity   Alcohol Use No    Comment: socially     Social History     Substance and Sexual Activity   Drug Use No     Social History     Tobacco Use   Smoking Status Former    Current packs/day: 0.00    Types: Cigarettes    Quit date: 1987    Years since quittin.6   Smokeless Tobacco Never     Family History   Problem Relation Age of Onset    Cancer Mother     Lung cancer Mother     Heart disease Father     Thyroid disease Father     Stroke Father     Hypertension Father     Diabetes Father     Rheum arthritis Father     Thyroid disease Sister     Hypertension Brother     Cancer Maternal Grandmother     Cancer Maternal Grandfather     Cancer Paternal Grandmother     Cancer Paternal Grandfather     Bipolar disorder Son     Post-traumatic stress disorder Son        Meds/Allergies       Current Outpatient Medications:     albuterol (Ventolin HFA) 90 mcg/act inhaler     aspirin 81 mg chewable tablet    Bioflavonoid Products (MARÍA-C PO)    calcium carbonate (TUMS) 500 mg chewable tablet    cetirizine (ZyrTEC) 10 mg tablet    ibuprofen (MOTRIN) 800 mg tablet    methylPREDNISolone 4 MG tablet therapy pack    Multiple Vitamins-Minerals (PRESERVISION AREDS 2 PO)    Prasterone (Intrarosa) 6.5 MG INST    triamcinolone (KENALOG) 0.1 % cream  No current facility-administered medications for this encounter.    Facility-Administered Medications Ordered in Other Encounters:     sodium chloride 0.9 % infusion, , Intravenous, Continuous PRN, New Bag at 08/28/24 1257    Allergies   Allergen Reactions    Carbamazepine Anaphylaxis    Elemental Sulfur Anaphylaxis    Phenytoin Anaphylaxis    Succinylcholine Myalgia       Objective     LMP  (LMP Unknown)       PHYSICAL EXAM    Gen: NAD  Head: NCAT  CV: RRR  CHEST: Clear  ABD: soft, NT/ND  EXT: no edema      ASSESSMENT/PLAN:  This is a 59 y.o. year old female here for EGD, and she is stable and optimized for her procedure.

## 2024-08-29 RX ORDER — ONDANSETRON 2 MG/ML
4 INJECTION INTRAMUSCULAR; INTRAVENOUS ONCE AS NEEDED
Status: DISCONTINUED | OUTPATIENT
Start: 2024-08-29 | End: 2024-09-01 | Stop reason: HOSPADM

## 2024-08-29 RX ORDER — ALBUTEROL SULFATE 0.83 MG/ML
2.5 SOLUTION RESPIRATORY (INHALATION) ONCE AS NEEDED
Status: DISCONTINUED | OUTPATIENT
Start: 2024-08-29 | End: 2024-09-01 | Stop reason: HOSPADM

## 2024-08-29 RX ORDER — FENTANYL CITRATE/PF 50 MCG/ML
25 SYRINGE (ML) INJECTION
Status: DISCONTINUED | OUTPATIENT
Start: 2024-08-29 | End: 2024-09-01 | Stop reason: HOSPADM

## 2024-09-03 PROCEDURE — 88305 TISSUE EXAM BY PATHOLOGIST: CPT | Performed by: SPECIALIST

## 2024-11-08 ENCOUNTER — TELEPHONE (OUTPATIENT)
Age: 59
End: 2024-11-08

## 2024-11-08 NOTE — TELEPHONE ENCOUNTER
Patient called to check if dr Rogel could prescribe her something for insomnia, it happened multiple times last week that its affecting her work. She's scheduled for next week to see Dr Rogel. Please advise    [facility]/pharmacy #[id number] - [place], [place] - [address]

## 2024-11-11 DIAGNOSIS — F41.9 ANXIETY: ICD-10-CM

## 2024-11-11 DIAGNOSIS — G47.09 OTHER INSOMNIA: Primary | ICD-10-CM

## 2024-11-11 RX ORDER — HYDROXYZINE HYDROCHLORIDE 10 MG/1
10 TABLET, FILM COATED ORAL
Qty: 30 TABLET | Refills: 0 | Status: SHIPPED | OUTPATIENT
Start: 2024-11-11

## 2024-11-13 ENCOUNTER — OFFICE VISIT (OUTPATIENT)
Dept: INTERNAL MEDICINE CLINIC | Facility: CLINIC | Age: 59
End: 2024-11-13
Payer: COMMERCIAL

## 2024-11-13 VITALS
SYSTOLIC BLOOD PRESSURE: 132 MMHG | HEIGHT: 65 IN | BODY MASS INDEX: 27.32 KG/M2 | HEART RATE: 73 BPM | DIASTOLIC BLOOD PRESSURE: 84 MMHG | WEIGHT: 164 LBS | OXYGEN SATURATION: 98 % | TEMPERATURE: 98 F

## 2024-11-13 DIAGNOSIS — Z12.31 ENCOUNTER FOR SCREENING MAMMOGRAM FOR MALIGNANT NEOPLASM OF BREAST: ICD-10-CM

## 2024-11-13 DIAGNOSIS — E78.2 MIXED HYPERLIPIDEMIA: ICD-10-CM

## 2024-11-13 DIAGNOSIS — Z23 ENCOUNTER FOR IMMUNIZATION: ICD-10-CM

## 2024-11-13 DIAGNOSIS — R73.01 IMPAIRED FASTING BLOOD SUGAR: ICD-10-CM

## 2024-11-13 DIAGNOSIS — Z00.00 ANNUAL PHYSICAL EXAM: Primary | ICD-10-CM

## 2024-11-13 PROCEDURE — 99396 PREV VISIT EST AGE 40-64: CPT | Performed by: INTERNAL MEDICINE

## 2024-11-13 PROCEDURE — 90471 IMMUNIZATION ADMIN: CPT

## 2024-11-13 PROCEDURE — 90673 RIV3 VACCINE NO PRESERV IM: CPT

## 2024-11-13 RX ORDER — PANTOPRAZOLE SODIUM 40 MG/10ML
INJECTION, POWDER, LYOPHILIZED, FOR SOLUTION INTRAVENOUS
COMMUNITY
Start: 2024-08-22 | End: 2024-11-13

## 2024-11-13 RX ORDER — RABEPRAZOLE SODIUM 20 MG/1
1 TABLET, DELAYED RELEASE ORAL DAILY
COMMUNITY
Start: 2024-09-12

## 2024-11-13 NOTE — PROGRESS NOTES
Adult Annual Physical  Name: Mikaela Edwards      : 1965      MRN: [id number]  Encounter Provider: Johana Rogel MD  Encounter Date: 2024   Encounter department: [facility]    Assessment & Plan  Annual physical exam    Orders:    CBC and differential; Future    Comprehensive metabolic panel; Future    Lipid Panel with Direct LDL reflex; Future    TSH, 3rd generation; Future    Hemoglobin A1C; Future    Encounter for screening mammogram for malignant neoplasm of breast    Orders:    Mammo screening bilateral w 3d and cad; Future    Mixed hyperlipidemia    Orders:    CBC and differential; Future    Comprehensive metabolic panel; Future    Lipid Panel with Direct LDL reflex; Future    TSH, 3rd generation; Future    Hemoglobin A1C; Future    Impaired fasting blood sugar    Orders:    CBC and differential; Future    Comprehensive metabolic panel; Future    Lipid Panel with Direct LDL reflex; Future    TSH, 3rd generation; Future    Hemoglobin A1C; Future      Immunizations and preventive care screenings were discussed with patient today. Appropriate education was printed on patient's after visit summary.    Counseling:  Exercise: the importance of regular exercise/physical activity was discussed. Recommend exercise 3-5 times per week for at least 30 minutes.          History of Present Illness     Adult Annual Physical:  Patient presents for annual physical. physical.     Diet and Physical Activity:  - Diet/Nutrition: well balanced diet.  - Exercise: moderate cardiovascular exercise.    General Health:  - Sleep: sleeps poorly.  - Hearing: normal hearing bilateral ears.  - Vision: no vision problems.  - Dental: regular dental visits.    /GYN Health:  - Follows with GYN: yes.   - History of STDs: no    Advanced Care Planning:  - Has an advanced directive?: yes    - Has a durable medical POA?: yes    - ACP document given to patient?: no      Review of Systems   Constitutional:   Negative for chills and fever.   HENT:  Negative for congestion, ear pain and sore throat.    Eyes:  Negative for pain.   Respiratory:  Negative for cough and shortness of breath.    Cardiovascular:  Negative for chest pain and leg swelling.   Gastrointestinal:  Negative for abdominal pain, nausea and vomiting.   Endocrine: Negative for polyuria.   Genitourinary:  Negative for difficulty urinating, frequency and urgency.   Musculoskeletal:  Positive for arthralgias. Negative for back pain.   Skin:  Negative for rash.   Neurological:  Negative for weakness and headaches.   Psychiatric/Behavioral:  Negative for sleep disturbance. The patient is not nervous/anxious.      Past Medical History   Past Medical History:   Diagnosis Date    Abnormal Pap smear of cervix     Allergic rhinitis     Anxiety and depression     Asthma     DDD (degenerative disc disease), lumbar     Factor 5 Leiden mutation, heterozygous (HCC)     Migraine     Seizures ([facility])     last-     Past Surgical History:   Procedure Laterality Date    ABDOMINOPLASTY      AUGMENTATION BREAST      CERVICAL CONE BIOPSY       SECTION      ENDOMETRIAL ABLATION      age 44    MAMMO (HISTORICAL)      ROTATOR CUFF REPAIR      TONSILLECTOMY      TUBAL LIGATION       Family History   Problem Relation Age of Onset    Cancer Mother     Lung cancer Mother     Heart disease Father     Thyroid disease Father     Stroke Father     Hypertension Father     Diabetes Father     Rheum arthritis Father     Thyroid disease Sister     Hypertension Brother     Cancer Maternal Grandmother     Cancer Maternal Grandfather     Cancer Paternal Grandmother     Cancer Paternal Grandfather     Bipolar disorder Son     Post-traumatic stress disorder Son       reports that she quit smoking about 37 years ago. Her smoking use included cigarettes. She has never been exposed to tobacco smoke. She has never used smokeless tobacco. She reports that she does not drink alcohol and  does not use drugs.  Current Outpatient Medications on File Prior to Visit   Medication Sig Dispense Refill    albuterol (Ventolin HFA) 90 mcg/act inhaler Inhale 2 puffs every 6 (six) hours as needed for wheezing 18 g 0    aspirin 81 mg chewable tablet Chew      Bioflavonoid Products (MARÍA-C PO) Take by mouth      cetirizine (ZyrTEC) 10 mg tablet Take by mouth      RABEprazole (ACIPHEX) 20 MG tablet Take 1 tablet by mouth in the morning      hydrOXYzine HCL (ATARAX) 10 mg tablet Take 1 tablet (10 mg total) by mouth daily at bedtime (Patient not taking: Reported on 11/13/2024) 30 tablet 0    ibuprofen (MOTRIN) 800 mg tablet Take 200 mg by mouth every 6 (six) hours as needed for mild pain (Patient not taking: Reported on 11/13/2024)      Multiple Vitamins-Minerals (PRESERVISION AREDS 2 PO) Take by mouth (Patient not taking: Reported on 11/13/2024)      triamcinolone (KENALOG) 0.1 % cream Apply topically 2 (two) times a day (Patient not taking: Reported on 11/13/2024) 30 g 0    [DISCONTINUED] calcium carbonate (TUMS) 500 mg chewable tablet Chew 1 tablet daily      [DISCONTINUED] methylPREDNISolone 4 MG tablet therapy pack Use as directed on package 21 each 0    [DISCONTINUED] pantoprazole (PROTONIX) 40 mg injection  (Patient not taking: Reported on 11/13/2024)      [DISCONTINUED] pantoprazole (PROTONIX) 40 mg tablet Take 40 mg by mouth daily (Patient not taking: Reported on 11/13/2024)      [DISCONTINUED] Prasterone (Intrarosa) 6.5 MG INST Insert 6.5 mg into the vagina in the morning (Patient not taking: Reported on 5/22/2024) 28 each 11     No current facility-administered medications on file prior to visit.     Allergies   Allergen Reactions    Carbamazepine Anaphylaxis    Elemental Sulfur Anaphylaxis    Phenytoin Anaphylaxis    Succinylcholine Myalgia      Current Outpatient Medications on File Prior to Visit   Medication Sig Dispense Refill    albuterol (Ventolin HFA) 90 mcg/act inhaler Inhale 2 puffs every 6  (six) hours as needed for wheezing 18 g 0    aspirin 81 mg chewable tablet Chew      Bioflavonoid Products (MARÍA-C PO) Take by mouth      cetirizine (ZyrTEC) 10 mg tablet Take by mouth      RABEprazole (ACIPHEX) 20 MG tablet Take 1 tablet by mouth in the morning      hydrOXYzine HCL (ATARAX) 10 mg tablet Take 1 tablet (10 mg total) by mouth daily at bedtime (Patient not taking: Reported on 2024) 30 tablet 0    ibuprofen (MOTRIN) 800 mg tablet Take 200 mg by mouth every 6 (six) hours as needed for mild pain (Patient not taking: Reported on 2024)      Multiple Vitamins-Minerals (PRESERVISION AREDS 2 PO) Take by mouth (Patient not taking: Reported on 2024)      triamcinolone (KENALOG) 0.1 % cream Apply topically 2 (two) times a day (Patient not taking: Reported on 2024) 30 g 0    [DISCONTINUED] calcium carbonate (TUMS) 500 mg chewable tablet Chew 1 tablet daily      [DISCONTINUED] methylPREDNISolone 4 MG tablet therapy pack Use as directed on package 21 each 0    [DISCONTINUED] pantoprazole (PROTONIX) 40 mg injection  (Patient not taking: Reported on 2024)      [DISCONTINUED] pantoprazole (PROTONIX) 40 mg tablet Take 40 mg by mouth daily (Patient not taking: Reported on 2024)      [DISCONTINUED] Prasterone (Intrarosa) 6.5 MG INST Insert 6.5 mg into the vagina in the morning (Patient not taking: Reported on 2024) 28 each 11     No current facility-administered medications on file prior to visit.      Social History     Tobacco Use    Smoking status: Former     Current packs/day: 0.00     Types: Cigarettes     Quit date: 1987     Years since quittin.8     Passive exposure: Never    Smokeless tobacco: Never   Vaping Use    Vaping status: Never Used   Substance and Sexual Activity    Alcohol use: No     Comment: socially    Drug use: No    Sexual activity: Yes     Partners: Male     Birth control/protection: Female Sterilization       Objective   /84 (BP Location:  "Left arm, Patient Position: Sitting, Cuff Size: Standard)   Pulse 73   Temp 98 °F (36.7 °C) (Temporal)   Ht 5' 4.5\" (1.638 m)   Wt 74.4 kg (164 lb)   LMP  (LMP Unknown)   SpO2 98%   BMI 27.72 kg/m²     Physical Exam  Vitals and nursing note reviewed.   Constitutional:       General: She is not in acute distress.     Appearance: She is well-developed.   HENT:      Head: Normocephalic and atraumatic.      Right Ear: Tympanic membrane, ear canal and external ear normal.      Left Ear: Tympanic membrane, ear canal and external ear normal.      Mouth/Throat:      Mouth: Mucous membranes are moist.      Pharynx: Oropharynx is clear.   Eyes:      Extraocular Movements: Extraocular movements intact.      Conjunctiva/sclera: Conjunctivae normal.   Cardiovascular:      Rate and Rhythm: Normal rate and regular rhythm.      Heart sounds: Normal heart sounds. No murmur heard.  Pulmonary:      Effort: Pulmonary effort is normal. No respiratory distress.      Breath sounds: Normal breath sounds. No wheezing or rales.   Abdominal:      General: Abdomen is flat. There is no distension.      Palpations: Abdomen is soft.      Tenderness: There is no abdominal tenderness.   Musculoskeletal:         General: No swelling.      Cervical back: Neck supple.      Right lower leg: No edema.      Left lower leg: No edema.   Skin:     General: Skin is warm and dry.      Capillary Refill: Capillary refill takes less than 2 seconds.   Neurological:      General: No focal deficit present.      Mental Status: She is alert and oriented to person, place, and time.   Psychiatric:         Mood and Affect: Mood normal.         "

## 2024-11-13 NOTE — PATIENT INSTRUCTIONS
"Patient Education     Routine physical for adults   The Basics   Written by the doctors and editors at [facility]   What is a physical? -- A physical is a routine visit, or \"check-up,\" with your doctor. You might also hear it called a \"wellness visit\" or \"preventive visit.\"  During each visit, the doctor will:   Ask about your physical and mental health   Ask about your habits, behaviors, and lifestyle   Do an exam   Give you vaccines if needed   Talk to you about any medicines you take   Give advice about your health   Answer your questions  Getting regular check-ups is an important part of taking care of your health. It can help your doctor find and treat any problems you have. But it's also important for preventing health problems.  A routine physical is different from a \"sick visit.\" A sick visit is when you see a doctor because of a health concern or problem. Since physicals are scheduled ahead of time, you can think about what you want to ask the doctor.  How often should I get a physical? -- It depends on your age and health. In general, for people age 21 years and older:   If you are younger than 50 years, you might be able to get a physical every 3 years.   If you are 50 years or older, your doctor might recommend a physical every year.  If you have an ongoing health condition, like diabetes or high blood pressure, your doctor will probably want to see you more often.  What happens during a physical? -- In general, each visit will include:   Physical exam - The doctor or nurse will check your height, weight, heart rate, and blood pressure. They will also look at your eyes and ears. They will ask about how you are feeling and whether you have any symptoms that bother you.   Medicines - It's a good idea to bring a list of all the medicines you take to each doctor visit. Your doctor will talk to you about your medicines and answer any questions. Tell them if you are having any side effects that bother you. You " "should also tell them if you are having trouble paying for any of your medicines.   Habits and behaviors - This includes:   Your diet   Your exercise habits   Whether you smoke, drink alcohol, or use drugs   Whether you are sexually active   Whether you feel safe at home  Your doctor will talk to you about things you can do to improve your health and lower your risk of health problems. They will also offer help and support. For example, if you want to quit smoking, they can give you advice and might prescribe medicines. If you want to improve your diet or get more physical activity, they can help you with this, too.   Lab tests, if needed - The tests you get will depend on your age and situation. For example, your doctor might want to check your:   Cholesterol   Blood sugar   Iron level   Vaccines - The recommended vaccines will depend on your age, health, and what vaccines you already had. Vaccines are very important because they can prevent certain serious or deadly infections.   Discussion of screening - \"Screening\" means checking for diseases or other health problems before they cause symptoms. Your doctor can recommend screening based on your age, risk, and preferences. This might include tests to check for:   Cancer, such as breast, prostate, cervical, ovarian, colorectal, prostate, lung, or skin cancer   Sexually transmitted infections, such as chlamydia and gonorrhea   Mental health conditions like depression and anxiety  Your doctor will talk to you about the different types of screening tests. They can help you decide which screenings to have. They can also explain what the results might mean.   Answering questions - The physical is a good time to ask the doctor or nurse questions about your health. If needed, they can refer you to other doctors or specialists, too.  Adults older than 65 years often need other care, too. As you get older, your doctor will talk to you about:   How to prevent falling at " home   Hearing or vision tests   Memory testing   How to take your medicines safely   Making sure that you have the help and support you need at home  All topics are updated as new evidence becomes available and our peer review process is complete.  This topic retrieved from [employer] on: May 02, 2024.  Topic 182681 Version 1.0  Release: 32.4.3 - C32.122  © 2024 UpToDate, Inc. and/or its affiliates. All rights reserved.  Consumer Information Use and Disclaimer   Disclaimer: This generalized information is a limited summary of diagnosis, treatment, and/or medication information. It is not meant to be comprehensive and should be used as a tool to help the user understand and/or assess potential diagnostic and treatment options. It does NOT include all information about conditions, treatments, medications, side effects, or risks that may apply to a specific patient. It is not intended to be medical advice or a substitute for the medical advice, diagnosis, or treatment of a health care provider based on the health care provider's examination and assessment of a patient's specific and unique circumstances. Patients must speak with a health care provider for complete information about their health, medical questions, and treatment options, including any risks or benefits regarding use of medications. This information does not endorse any treatments or medications as safe, effective, or approved for treating a specific patient. UpToDate, Inc. and its affiliates disclaim any warranty or liability relating to this information or the use thereof.The use of this information is governed by the Terms of Use, available at https://www.[employer].com/en/know/clinical-effectiveness-terms. 2024© UpToDate, Inc. and its affiliates and/or licensors. All rights reserved.  Copyright   © 2024 UpToDate, Inc. and/or its affiliates. All rights reserved.

## 2024-11-13 NOTE — ASSESSMENT & PLAN NOTE
Orders:    CBC and differential; Future    Comprehensive metabolic panel; Future    Lipid Panel with Direct LDL reflex; Future    TSH, 3rd generation; Future    Hemoglobin A1C; Future

## 2024-11-20 ENCOUNTER — ANNUAL EXAM (OUTPATIENT)
Dept: OBGYN CLINIC | Facility: CLINIC | Age: 59
End: 2024-11-20
Payer: COMMERCIAL

## 2024-11-20 VITALS — DIASTOLIC BLOOD PRESSURE: 90 MMHG | WEIGHT: 170.4 LBS | BODY MASS INDEX: 28.8 KG/M2 | SYSTOLIC BLOOD PRESSURE: 140 MMHG

## 2024-11-20 DIAGNOSIS — Z01.419 ENCOUNTER FOR ANNUAL ROUTINE GYNECOLOGICAL EXAMINATION: Primary | ICD-10-CM

## 2024-11-20 DIAGNOSIS — Z12.31 ENCOUNTER FOR SCREENING MAMMOGRAM FOR MALIGNANT NEOPLASM OF BREAST: ICD-10-CM

## 2024-11-20 DIAGNOSIS — N95.2 VAGINAL ATROPHY: ICD-10-CM

## 2024-11-20 DIAGNOSIS — N94.10 DYSPAREUNIA, FEMALE: ICD-10-CM

## 2024-11-20 PROCEDURE — 99396 PREV VISIT EST AGE 40-64: CPT | Performed by: OBSTETRICS & GYNECOLOGY

## 2024-11-20 NOTE — PROGRESS NOTES
Name: Mikaela Edwards      : 1965      MRN: [id number]  Encounter Provider: Inés Sadler DO  Encounter Date: 2024   Encounter department: [facility]  :  Assessment & Plan  Encounter for annual routine gynecological examination         Encounter for screening mammogram for malignant neoplasm of breast    Orders:    Mammo screening bilateral w 3d and cad; Future    Vaginal atrophy         Dyspareunia, female    Orders:    Ambulatory Referral to Physical Therapy; Future      Pap done for cytology reflex HPV.  Encouraged self breast examination as well as calcium supplementation.  Continue annual mammogram.  Reviewed colon cancer screening, up to date.  Discussed over-the-counter vaginal moisturizer, Revaree +2 times per week over the next 6 weeks.  If there is no improvement discussed going back to Intrarosa.  Recommend further evaluation with pelvic floor physical therapy.  All questions answered.  She will return to office in 1 year or as needed      History of Present Illness     HPI  Mikaela Edwards is a 59 y.o. female who presents     This is a pleasant 59-year-old female P2 ( x 1,  x 1 with tubal ligation) presents for her GYN exam.  She went through menopause at age 48.  She has never been on hormone replacement therapy.  She denies any vaginal bleeding or spotting.  No changes in bowel or bladder function.  She is sexually active and has been in a monogamous relationship with her  for over 42 years.  She has had significant vaginal dryness.  She was using Intrarosa and felt that it was not working as well.  She was also on vaginal estrogen several years prior.  She does have a history of heterozygous factor V Leiden.    GYN history significant for cone biopsy age 30 Pap smears have been normal since then.      Colonoscopy  follow-up 10 years    Mammogram    Pap        History obtained from: patient    Review of Systems   Constitutional:  Negative for fatigue,  fever and unexpected weight change.   Respiratory:  Negative for cough, chest tightness, shortness of breath and wheezing.    Cardiovascular: Negative.  Negative for chest pain and palpitations.   Gastrointestinal: Negative.  Negative for abdominal distention, abdominal pain, blood in stool, constipation, diarrhea, nausea and vomiting.   Genitourinary: Negative.  Negative for difficulty urinating, dyspareunia, dysuria, flank pain, frequency, genital sores, hematuria, pelvic pain, urgency, vaginal bleeding, vaginal discharge and vaginal pain.   Skin:  Negative for rash.     Current Outpatient Medications on File Prior to Visit   Medication Sig Dispense Refill    albuterol (Ventolin HFA) 90 mcg/act inhaler Inhale 2 puffs every 6 (six) hours as needed for wheezing 18 g 0    aspirin 81 mg chewable tablet Chew      Bioflavonoid Products (MARÍA-C PO) Take by mouth      cetirizine (ZyrTEC) 10 mg tablet Take by mouth      RABEprazole (ACIPHEX) 20 MG tablet Take 1 tablet by mouth in the morning      hydrOXYzine HCL (ATARAX) 10 mg tablet Take 1 tablet (10 mg total) by mouth daily at bedtime (Patient not taking: Reported on 11/20/2024) 30 tablet 0    ibuprofen (MOTRIN) 800 mg tablet Take 200 mg by mouth every 6 (six) hours as needed for mild pain (Patient not taking: Reported on 11/13/2024)      Multiple Vitamins-Minerals (PRESERVISION AREDS 2 PO) Take by mouth (Patient not taking: Reported on 11/13/2024)      triamcinolone (KENALOG) 0.1 % cream Apply topically 2 (two) times a day (Patient not taking: Reported on 11/13/2024) 30 g 0     No current facility-administered medications on file prior to visit.         Objective   /90   Wt 77.3 kg (170 lb 6.4 oz)   LMP  (LMP Unknown)   BMI 28.80 kg/m²      Physical Exam  Constitutional:       Appearance: Normal appearance. She is well-developed.   HENT:      Head: Normocephalic and atraumatic.   Cardiovascular:      Rate and Rhythm: Normal rate and regular rhythm.    Pulmonary:      Effort: Pulmonary effort is normal.      Breath sounds: Normal breath sounds.   Chest:   Breasts:     Right: No inverted nipple, mass, nipple discharge, skin change or tenderness.      Left: No inverted nipple, mass, nipple discharge, skin change or tenderness.   Abdominal:      General: Bowel sounds are normal. There is no distension.      Palpations: Abdomen is soft.      Tenderness: There is no abdominal tenderness. There is no guarding or rebound.   Genitourinary:     Labia:         Right: No rash, tenderness or lesion.         Left: No rash, tenderness or lesion.       Vagina: Normal. No signs of injury. No vaginal discharge or tenderness.      Cervix: No cervical motion tenderness, discharge, friability, lesion or cervical bleeding.      Uterus: Not enlarged and not tender.       Adnexa:         Right: No mass, tenderness or fullness.          Left: No mass, tenderness or fullness.     Neurological:      Mental Status: She is alert.   Psychiatric:         Behavior: Behavior normal.     External genitalia is within normal limits.  The vagina is evident of slight estrogen deficiency.  The cervix is slightly distorted due to prior surgery.  No pelvic floor prolapse.    Administrative Statements   I have spent a total time of 30 minutes in caring for this patient on the day of the visit/encounter including Prognosis, Risks and benefits of tx options, Instructions for management, Impressions, Counseling / Coordination of care, Documenting in the medical record, Reviewing / ordering tests, medicine, procedures  , and Obtaining or reviewing history  .

## 2024-11-27 LAB
CLINICAL INFO: NORMAL
CYTO CVX: NORMAL
CYTOLOGY CMNT CVX/VAG CYTO-IMP: NORMAL
DATE PREVIOUS BX: NORMAL
LMP START DATE: NORMAL
SL AMB PREV. PAP:: NORMAL
SPECIMEN SOURCE CVX/VAG CYTO: NORMAL
STAT OF ADQ CVX/VAG CYTO-IMP: NORMAL

## 2024-11-29 DIAGNOSIS — G47.09 OTHER INSOMNIA: ICD-10-CM

## 2024-11-29 DIAGNOSIS — F41.9 ANXIETY: ICD-10-CM

## 2024-11-29 RX ORDER — HYDROXYZINE HYDROCHLORIDE 10 MG/1
10 TABLET, FILM COATED ORAL
Qty: 30 TABLET | Refills: 0 | Status: SHIPPED | OUTPATIENT
Start: 2024-11-29 | End: 2024-12-04 | Stop reason: SDUPTHER

## 2024-11-29 NOTE — TELEPHONE ENCOUNTER
Medication: hydrOXYzine HCL (ATARAX)     Dose/Frequency: 10  mg    Quantity: 90 tablet    Pharmacy:   [facility]/pharmacy #[id number] - [place], [place] - [address]       Office:   [x] PCP/Provider -   [] Speciality/Provider -     Does the patient have enough for 3 days?   [x] Yes   [] No - Send as HP to POD

## 2024-12-02 ENCOUNTER — TELEPHONE (OUTPATIENT)
Age: 59
End: 2024-12-02

## 2024-12-02 NOTE — TELEPHONE ENCOUNTER
Pt called 11/29 to refill  hydrOXYzine HCL (ATARAX) 10 mg tablet. Pt requested the rx to be for 2-10mg tablets at night as the 1-10MG tablet was not working. Pt she discussed with Dr Rogel and he said it was ok to increase dose to 20-total MG. Order was sent to pharmacy incorrectly. Pt requesting additional 90-tablets to be sent to [facility]/pharmacy #[id number] - [place], [place] - [address] [phone] for the remaining amount needed.  Pt# [phone]

## 2024-12-04 DIAGNOSIS — F41.9 ANXIETY: ICD-10-CM

## 2024-12-04 DIAGNOSIS — G47.09 OTHER INSOMNIA: ICD-10-CM

## 2024-12-04 RX ORDER — HYDROXYZINE HYDROCHLORIDE 10 MG/1
20 TABLET, FILM COATED ORAL
Qty: 180 TABLET | Refills: 0 | Status: SHIPPED | OUTPATIENT
Start: 2024-12-04

## 2024-12-31 ENCOUNTER — TELEPHONE (OUTPATIENT)
Age: 59
End: 2024-12-31

## 2024-12-31 NOTE — TELEPHONE ENCOUNTER
Pt called. States experiencing sore throat and headaches. No available appts. Advised patient to be further evaluated in UC.

## 2025-01-03 ENCOUNTER — OFFICE VISIT (OUTPATIENT)
Dept: INTERNAL MEDICINE CLINIC | Facility: CLINIC | Age: 60
End: 2025-01-03
Payer: COMMERCIAL

## 2025-01-03 VITALS
HEART RATE: 75 BPM | WEIGHT: 164 LBS | HEIGHT: 65 IN | BODY MASS INDEX: 27.32 KG/M2 | TEMPERATURE: 98.2 F | OXYGEN SATURATION: 95 % | SYSTOLIC BLOOD PRESSURE: 130 MMHG | DIASTOLIC BLOOD PRESSURE: 86 MMHG

## 2025-01-03 DIAGNOSIS — J04.10 TRACHEITIS: Primary | ICD-10-CM

## 2025-01-03 PROCEDURE — 99213 OFFICE O/P EST LOW 20 MIN: CPT | Performed by: INTERNAL MEDICINE

## 2025-01-03 RX ORDER — AZITHROMYCIN 250 MG/1
TABLET, FILM COATED ORAL
Qty: 6 TABLET | Refills: 0 | Status: SHIPPED | OUTPATIENT
Start: 2025-01-03 | End: 2025-01-08

## 2025-01-03 RX ORDER — DEXTROMETHORPHAN HYDROBROMIDE AND PROMETHAZINE HYDROCHLORIDE 15; 6.25 MG/5ML; MG/5ML
5 SYRUP ORAL 4 TIMES DAILY PRN
Qty: 150 ML | Refills: 0 | Status: SHIPPED | OUTPATIENT
Start: 2025-01-03

## 2025-01-03 NOTE — PROGRESS NOTES
Assessment/Plan:             1. Tracheitis  -     azithromycin (Zithromax) 250 mg tablet; Take 2 tablets (500 mg total) by mouth daily for 1 day, THEN 1 tablet (250 mg total) daily for 4 days.  -     promethazine-dextromethorphan (PHENERGAN-DM) 6.25-15 mg/5 mL oral syrup; Take 5 mL by mouth 4 (four) times a day as needed for cough         Subjective:      Patient ID: Mikaela Edwards is a 59 y.o. female.    Cough, brownish sputum,        The following portions of the patient's history were reviewed and updated as appropriate: She  has a past medical history of Abnormal Pap smear of cervix, Allergic rhinitis, Anxiety and depression, Asthma, DDD (degenerative disc disease), lumbar, Factor 5 Leiden mutation, heterozygous ([facility]), Migraine, and Seizures ([facility]).  She   Patient Active Problem List    Diagnosis Date Noted    Adhesive capsulitis of left shoulder 2023    Tendinitis of left rotator cuff 2023    Acute pain of left shoulder 2023    Muscle cramp 2023    Trigeminal neuralgia 2023    Eczema 2023    Lower esophageal ring ([place]) 04/15/2021    Osteopenia after menopause 04/15/2021    Post-menopausal osteoporosis 04/15/2021    Mixed hyperlipidemia 04/15/2021    Other dysphagia 2021    Gastroesophageal reflux disease 2021    Mild intermittent asthma without complication 10/02/2020    Migraine without aura and without status migrainosus, not intractable 10/02/2020    Lumbar disc herniation with radiculopathy 10/02/2020    Disorder of rotator cuff, right 10/02/2020    Dysthymic disorder 10/02/2020    Screening mammogram for breast cancer 10/02/2020    Body mass index 26.0-26.9, adult 10/02/2020    Fatigue 10/02/2020    Asthma 2013    Factor V deficiency ([facility]) 2013    Exudative senile macular degeneration of retina ([facility]) 2013     She  has a past surgical history that includes  section; AUGMENTATION BREAST; Endometrial ablation; Tubal ligation;  Abdominoplasty; Cervical cone biopsy (1995); Rotator cuff repair; Tonsillectomy; and Mammo (historical) (2010).  Her family history includes Bipolar disorder in her son; Cancer in her maternal grandfather, maternal grandmother, mother, paternal grandfather, and paternal grandmother; Diabetes in her father; Heart disease in her father; Hypertension in her brother and father; Lung cancer in her mother; Post-traumatic stress disorder in her son; Rheum arthritis in her father; Stroke in her father; Thyroid disease in her father and sister.  She  reports that she quit smoking about 38 years ago. Her smoking use included cigarettes. She has never been exposed to tobacco smoke. She has never used smokeless tobacco. She reports that she does not drink alcohol and does not use drugs.  Current Outpatient Medications   Medication Sig Dispense Refill    albuterol (Ventolin HFA) 90 mcg/act inhaler Inhale 2 puffs every 6 (six) hours as needed for wheezing 18 g 0    aspirin 81 mg chewable tablet Chew 81 mg daily      azithromycin (Zithromax) 250 mg tablet Take 2 tablets (500 mg total) by mouth daily for 1 day, THEN 1 tablet (250 mg total) daily for 4 days. 6 tablet 0    Bioflavonoid Products (MARÍA-C PO) Take by mouth      cetirizine (ZyrTEC) 10 mg tablet Take by mouth      hydrOXYzine HCL (ATARAX) 10 mg tablet Take 2 tablets (20 mg total) by mouth daily at bedtime 180 tablet 0    methylPREDNISolone (MEDROL) 2 MG tablet Take 2 mg by mouth daily      promethazine-dextromethorphan (PHENERGAN-DM) 6.25-15 mg/5 mL oral syrup Take 5 mL by mouth 4 (four) times a day as needed for cough 150 mL 0    RABEprazole (ACIPHEX) 20 MG tablet Take 1 tablet by mouth in the morning      ibuprofen (MOTRIN) 800 mg tablet Take 200 mg by mouth every 6 (six) hours as needed for mild pain (Patient not taking: Reported on 11/13/2024)      Multiple Vitamins-Minerals (PRESERVISION AREDS 2 PO) Take by mouth (Patient not taking: Reported on 11/13/2024)       triamcinolone (KENALOG) 0.1 % cream Apply topically 2 (two) times a day (Patient not taking: Reported on 11/13/2024) 30 g 0     No current facility-administered medications for this visit.     Current Outpatient Medications on File Prior to Visit   Medication Sig    albuterol (Ventolin HFA) 90 mcg/act inhaler Inhale 2 puffs every 6 (six) hours as needed for wheezing    aspirin 81 mg chewable tablet Chew 81 mg daily    Bioflavonoid Products (MARÍA-C PO) Take by mouth    cetirizine (ZyrTEC) 10 mg tablet Take by mouth    hydrOXYzine HCL (ATARAX) 10 mg tablet Take 2 tablets (20 mg total) by mouth daily at bedtime    methylPREDNISolone (MEDROL) 2 MG tablet Take 2 mg by mouth daily    RABEprazole (ACIPHEX) 20 MG tablet Take 1 tablet by mouth in the morning    ibuprofen (MOTRIN) 800 mg tablet Take 200 mg by mouth every 6 (six) hours as needed for mild pain (Patient not taking: Reported on 11/13/2024)    Multiple Vitamins-Minerals (PRESERVISION AREDS 2 PO) Take by mouth (Patient not taking: Reported on 11/13/2024)    triamcinolone (KENALOG) 0.1 % cream Apply topically 2 (two) times a day (Patient not taking: Reported on 11/13/2024)     No current facility-administered medications on file prior to visit.     She is allergic to carbamazepine, elemental sulfur, phenytoin, and succinylcholine..    Review of Systems   Constitutional:  Negative for chills and fever.   HENT:  Negative for congestion, ear pain and sore throat.    Eyes:  Negative for pain.   Respiratory:  Positive for cough. Negative for shortness of breath.    Cardiovascular:  Negative for chest pain and leg swelling.   Gastrointestinal:  Negative for abdominal pain, nausea and vomiting.   Endocrine: Negative for polyuria.   Genitourinary:  Negative for difficulty urinating, frequency and urgency.   Musculoskeletal:  Negative for arthralgias and back pain.   Skin:  Negative for rash.   Neurological:  Negative for weakness and headaches.   Psychiatric/Behavioral:   "Negative for sleep disturbance. The patient is not nervous/anxious.          Objective:      /86 (BP Location: Left arm, Patient Position: Sitting, Cuff Size: Standard)   Pulse 75   Temp 98.2 °F (36.8 °C) (Temporal)   Ht 5' 4.5\" (1.638 m)   Wt 74.4 kg (164 lb)   LMP  (LMP Unknown)   SpO2 95%   BMI 27.72 kg/m²     Recent Results (from the past 8 weeks)   Thinprep Tis Pap Reflex HPV mRNA E6/E7    Collection Time: 11/20/24  4:18 PM   Result Value Ref Range    [facility] CLINICAL INFORMATION None given     LMP None given     Prev. PAP None given     Prev. BX None given     Source Cervix     Statement of Adequacy SATISFACTORY FOR EVALUATION     Interpretation/Result       Cytology Results: Negative for intraepithelial lesion or malignancy.    Comment       This Pap test has been evaluated with computer assisted technology.    [facility] CYTOTECHNOLOGIST:      Comment          Physical Exam  Constitutional:       Appearance: Normal appearance.   HENT:      Head: Normocephalic.      Right Ear: Tympanic membrane, ear canal and external ear normal.      Left Ear: Tympanic membrane, ear canal and external ear normal.      Nose: Nose normal. No congestion.      Mouth/Throat:      Mouth: Mucous membranes are moist.      Pharynx: Oropharynx is clear. No oropharyngeal exudate or posterior oropharyngeal erythema.   Eyes:      Extraocular Movements: Extraocular movements intact.      Conjunctiva/sclera: Conjunctivae normal.   Cardiovascular:      Rate and Rhythm: Normal rate and regular rhythm.      Heart sounds: Normal heart sounds. No murmur heard.  Pulmonary:      Effort: Pulmonary effort is normal.      Breath sounds: Normal breath sounds. No wheezing or rales.   Abdominal:      General: Abdomen is flat. There is no distension.      Palpations: Abdomen is soft.      Tenderness: There is no abdominal tenderness.   Musculoskeletal:      Cervical back: Normal range of motion and neck supple.      Right lower leg: No edema.    "   Left lower leg: No edema.   Lymphadenopathy:      Cervical: No cervical adenopathy.   Skin:     General: Skin is warm.   Neurological:      General: No focal deficit present.      Mental Status: She is alert and oriented to person, place, and time.

## 2025-01-29 ENCOUNTER — EVALUATION (OUTPATIENT)
Dept: PHYSICAL THERAPY | Facility: REHABILITATION | Age: 60
End: 2025-01-29
Payer: COMMERCIAL

## 2025-01-29 DIAGNOSIS — N94.10 DYSPAREUNIA, FEMALE: ICD-10-CM

## 2025-01-29 PROCEDURE — 97110 THERAPEUTIC EXERCISES: CPT | Performed by: PHYSICAL THERAPIST

## 2025-01-29 NOTE — PROGRESS NOTES
PT Evaluation     Today's date: 2025  Patient name: Mikaela Edwards  : 1965  MRN: [id number]  Referring provider: Inés Sadler DO  Dx:   Encounter Diagnosis     ICD-10-CM    1. Dyspareunia, female  N94.10 Ambulatory Referral to Physical Therapy          Assessment    Assessment details: Mikaela Edwards is a 59 y.o.  postmenopausal female with complaints of dyspareunia and urge UI.  Patient's presentation is consistent with genitourinary syndrome of menopause with mild vulvovaginal atrophy and PFM restrictions. Mikaela Edwards is a good candidate for physical therapy and would benefit from skilled physical therapy (specifically, vaginal dilator training and manual therapy) to address the above impairments in order to allow the patient to achieve the goals listed and return to prior level of function.      During initial evaluation, education was provided on anatomy and function of the pelvic floor muscles, viscerosomatic convergence and regional interdependence of the lumbo-pelvic-hip complex.  Patient also educated on diagnosis, plan of care and prognosis. Patient provided written and verbal consent for pelvic floor muscle exam. She is in agreement with recommended plan of care and goals for therapy, and demonstrates motivation for active participation in proposed plan of care.   Understanding of Dx/Px/POC: good     Prognosis: good    Goals  1. Patient will demonstrate independence and compliance with vaginal dilator use up to #3 in the next 6-8 weeks.  2. Painfree intravaginal digital exam in 6 weeks.  3. Painfree intercourse in 8 weeks.  4. Urge UI resolves in 4 weeks.    Plan    Frequency: 1x week  Duration in weeks: 8  Plan of Care beginning date: 2025  Plan of Care expiration date: 3/27/2025  Treatment plan discussed with: patient and referring physician        PT Pelvic Floor Subjective:   History of Present Illness:   Dyspareunia x3 years, with vaginal bleeding upon attempts. Has  abstained for a new months as a result. Has tried various products: inter-ace, lubricant, Revaree+ (most recently, 8 weeks 2x/week with supplementing weekly with Revaree). These products have improved the burning but she still feels moderately limited in sexual function.  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 Is considering Osphena but wants to start with pelvic PT.   GYN: , 1 vaginal (episiotomy with perineal laceration) and 1 scheduled induction to  (noncomplicated healing). Regular menstruation until early 40s then had a uterine ablation almost 20 years due to heavy periods.  BLADDER: urge UI, brianne with work and delaying, occurring 1-2x/week. Also experiences sudden urgency. Nocturia 0-1x/night. Daytime void interval 2-4 hours. Drinks tea (caffeinated) 24oz, water 50 oz, occasionally milk 12oz.  BOWEL: denies urgency or incontinence; denies constipation, strains occasionally.  ORTHO: episodic low back pain, managed with a steroid dose pain approx 1x/year.   Works full time as optomologist technician  Exercise: dog walking, occasional hiking; has a TM but does not use it currently. Occasional weight training.      Objective       Abdominal Assessment:      Abdominal Assessment: Abdominal panniculectomy scar well healed, moderately restricted; diaphragmatic breathing moderately restricted    Diastatis   Diastasis recti present? no  Connective tissue integrity at linea alba: firm     Visual Inspection of Perineum:   Excursion of perineal body in cephalad direction with contraction of pelvic floor muscles (PFM): good  Excursion of perineal body in caudal direction with relaxation of pelvic floor muscles (PFM): good   Involuntary contraction with coughing: yes  Involuntary relaxation with bearing down: yes  PFM Contraction Comments: TTP layer 1-2 with mild restrictions R>L  Cotton swab test: tender  Sensation: intact    Pelvic Organ Prolapse   no pelvic organ prolapse  Perineal body  inspection: within normal limits   Atrophic changes: blanching       Pelvic Floor Muscle Exam:     Muscle Contraction: well isolated   Breathing pattern with contraction: within normal limits         PERFECT Score   Power right: 3+/5   Power left: 3+/5          pelvic floor exam consent given by patient    Pelvic exam completed: vaginally              Precautions: factor 5    POC expires  Auth Status? (BOMN, approved, pending) Unit limit (Daily) Auth Start date Expiration date PT/OT + Visit Limit?   3/27/25          Date of Service 1/29        Visits Used 1        Visits Remaining         Patient Education         [employer] acct created?         PFM anatomy and function 5'                          Neuro Re-Ed         Urge deferral 5'        DB                  Ther Ex         Self-manuals         Dilator training Ordering info provided        PFM stretches                  Ther Activity                                    Manual Ther         PFM exam completed        Ortho exam         PFM STM/MFR  **

## 2025-01-30 PROCEDURE — 97161 PT EVAL LOW COMPLEX 20 MIN: CPT | Performed by: PHYSICAL THERAPIST

## 2025-02-12 ENCOUNTER — OFFICE VISIT (OUTPATIENT)
Dept: PHYSICAL THERAPY | Facility: REHABILITATION | Age: 60
End: 2025-02-12
Payer: COMMERCIAL

## 2025-02-12 DIAGNOSIS — N94.10 DYSPAREUNIA, FEMALE: Primary | ICD-10-CM

## 2025-02-12 PROCEDURE — 97140 MANUAL THERAPY 1/> REGIONS: CPT | Performed by: PHYSICAL THERAPIST

## 2025-02-12 PROCEDURE — 97110 THERAPEUTIC EXERCISES: CPT | Performed by: PHYSICAL THERAPIST

## 2025-02-12 NOTE — PROGRESS NOTES
Daily Note     Today's date: 2025  Patient name: Mikaela Edwards  : 1965  MRN: [id number]  Referring provider: Inés Sadler DO  Dx:   Encounter Diagnosis     ICD-10-CM    1. Dyspareunia, female  N94.10           Subjective: Brings [employer] dilators (full kit) today. Practiced urge delay a few times but often forgot. Reports no urge UI since last visit.    Objective: See treatment diary below    Assessment: Trained patient in use of silicone vaginal dilators, and pelvic drop and DB muscle relaxation strategies, in order to progress PF downtraining and desensitization. Patient demonstrates safety and effectiveness of technique and will implement dilator size 2 for HEP 3-4 days per week x10 minutes.    Goals  1. Patient will demonstrate independence and compliance with vaginal dilator use up to #3 in the next 6-8 weeks.  2. Painfree intravaginal digital exam in 6 weeks.  3. Painfree intercourse in 8 weeks.  4. Urge UI resolves in 4 weeks.    Plan: Continue per plan of care.      Precautions: factor 5    POC expires  Auth Status? (BOMN, approved, pending) Unit limit (Daily) Auth Start date Expiration date PT/OT + Visit Limit?   3/27/25 BOMN         Date of Service        Visits Used 1 2       Visits Remaining         Patient Education         [employer] acct created?         PFM anatomy and function 5'                          Neuro Re-Ed         Urge deferral 5'        DB                  Ther Ex         Self-manuals         Dilator training Ordering info provided 30'       PFM stretches                  Ther Activity                                    Manual Ther         PFM exam completed        Ortho exam         PFM STM/MFR  10'

## 2025-02-28 ENCOUNTER — OFFICE VISIT (OUTPATIENT)
Dept: PHYSICAL THERAPY | Facility: REHABILITATION | Age: 60
End: 2025-02-28
Payer: COMMERCIAL

## 2025-02-28 DIAGNOSIS — N94.10 DYSPAREUNIA, FEMALE: Primary | ICD-10-CM

## 2025-02-28 PROCEDURE — 97110 THERAPEUTIC EXERCISES: CPT | Performed by: PHYSICAL THERAPIST

## 2025-02-28 PROCEDURE — 97140 MANUAL THERAPY 1/> REGIONS: CPT | Performed by: PHYSICAL THERAPIST

## 2025-02-28 NOTE — PROGRESS NOTES
Daily Note     Today's date: 2025  Patient name: Mikaela Edwards  : 1965  MRN: [id number]  Referring provider: Inés Sadler DO  Dx:   Encounter Diagnosis     ICD-10-CM    1. Dyspareunia, female  N94.10         Subjective: Has used dilators 6 times in the last 2 weeks, no pain during use. Is on size #2. Reports no urge UI, but some urgency - at work, or at home if she drinks a lot and waits too long. Tried intercourse with some pain and some bleeding (the usual amount).    Objective: See treatment diary below    Assessment: Reviewed urge deferral. Progressed to dilator #3 today painfree. Patient to progress indep to dilator #4 in 2-3 weeks. Introduced and practiced pelvic drop and DB with dilators and discussed translation to intercourse.     Goals  1. Patient will demonstrate independence and compliance with vaginal dilator use up to #3 in the next 6-8 weeks.  2. Painfree intravaginal digital exam in 6 weeks.  3. Painfree intercourse in 8 weeks.  4. Urge UI resolves in 4 weeks. MET    Plan: Continue per plan of care.      Precautions: factor 5    POC expires  Auth Status? (BOMN, approved, pending) Unit limit (Daily) Auth Start date Expiration date PT/OT + Visit Limit?   3/27/25 BOMN         Date of Service        Visits Used 1 2       Visits Remaining         Patient Education         [employer] acct created?         PFM anatomy and function 5'                          Neuro Re-Ed         Urge deferral 5'        DB   W manuals      Pelvic drop   Practiced w manuals and dilator      Ther Ex         Self-manuals         Dilator training Ordering info provided 30' 15'      PFM stretches                  Ther Activity                                    Manual Ther         PFM exam completed        Ortho exam         PFM STM/MFR  10' 15'

## 2025-03-07 DIAGNOSIS — G47.09 OTHER INSOMNIA: ICD-10-CM

## 2025-03-07 DIAGNOSIS — F41.9 ANXIETY: ICD-10-CM

## 2025-03-07 RX ORDER — HYDROXYZINE HYDROCHLORIDE 10 MG/1
20 TABLET, FILM COATED ORAL
Qty: 180 TABLET | Refills: 1 | Status: SHIPPED | OUTPATIENT
Start: 2025-03-07